# Patient Record
Sex: FEMALE | Race: WHITE | Employment: FULL TIME | URBAN - METROPOLITAN AREA
[De-identification: names, ages, dates, MRNs, and addresses within clinical notes are randomized per-mention and may not be internally consistent; named-entity substitution may affect disease eponyms.]

---

## 2018-03-12 ENCOUNTER — OFFICE VISIT (OUTPATIENT)
Dept: SURGERY | Facility: CLINIC | Age: 48
End: 2018-03-12

## 2018-03-12 ENCOUNTER — APPOINTMENT (OUTPATIENT)
Dept: LAB | Facility: HOSPITAL | Age: 48
End: 2018-03-12
Attending: UROLOGY
Payer: COMMERCIAL

## 2018-03-12 VITALS
DIASTOLIC BLOOD PRESSURE: 75 MMHG | WEIGHT: 155 LBS | TEMPERATURE: 99 F | SYSTOLIC BLOOD PRESSURE: 125 MMHG | BODY MASS INDEX: 25.83 KG/M2 | HEART RATE: 64 BPM | HEIGHT: 65 IN

## 2018-03-12 DIAGNOSIS — R31.29 MICROHEMATURIA: Primary | ICD-10-CM

## 2018-03-12 DIAGNOSIS — R31.29 MICROHEMATURIA: ICD-10-CM

## 2018-03-12 LAB
BILIRUB UR QL: NEGATIVE
COLOR UR: YELLOW
GLUCOSE UR-MCNC: NEGATIVE MG/DL
HGB UR QL STRIP.AUTO: NEGATIVE
KETONES UR-MCNC: NEGATIVE MG/DL
LEUKOCYTE ESTERASE UR QL STRIP.AUTO: NEGATIVE
NITRITE UR QL STRIP.AUTO: NEGATIVE
PH UR: 7 [PH] (ref 5–8)
PROT UR-MCNC: NEGATIVE MG/DL
SP GR UR STRIP: 1.02 (ref 1–1.03)
UROBILINOGEN UR STRIP-ACNC: <2
VIT C UR-MCNC: NEGATIVE MG/DL

## 2018-03-12 PROCEDURE — 99212 OFFICE O/P EST SF 10 MIN: CPT | Performed by: UROLOGY

## 2018-03-12 PROCEDURE — 99244 OFF/OP CNSLTJ NEW/EST MOD 40: CPT | Performed by: UROLOGY

## 2018-03-12 PROCEDURE — 81003 URINALYSIS AUTO W/O SCOPE: CPT

## 2018-03-12 RX ORDER — LEVOTHYROXINE SODIUM 112 UG/1
CAPSULE ORAL
COMMUNITY
Start: 2018-01-25

## 2018-03-12 NOTE — PROGRESS NOTES
SUBJECTIVE:  Brunilda Garnett is a 41year old female who presents for a consultation at the request of, and a copy of this note will be sent to, Dr. Adelso Singh, for evaluation of  hematuria. She states that the problem is unchanged.  Symptoms include none masses, organomegaly or guarding  Skin exam grossly normal     ASSESSMENT/PLAN  Microhematuria  (primary encounter diagnosis)  reviewed findings at length with patient. She will drop off  Urine testing. And we agreed that she would have a follow-up urine w

## 2018-03-19 ENCOUNTER — TELEPHONE (OUTPATIENT)
Dept: SURGERY | Facility: CLINIC | Age: 48
End: 2018-03-19

## 2018-04-24 ENCOUNTER — OFFICE VISIT (OUTPATIENT)
Dept: INTEGRATIVE MEDICINE | Facility: HOSPITAL | Age: 48
End: 2018-04-24
Attending: INTERNAL MEDICINE
Payer: COMMERCIAL

## 2018-04-24 PROCEDURE — 97802 MEDICAL NUTRITION INDIV IN: CPT | Performed by: NUTRITIONIST

## 2018-04-24 NOTE — PROGRESS NOTES
Patient referred by Dr. Azael Patterson  Did patient complete Nutritional Therapy Questionnaire? Anthonette Angelucci is a 41year old female presenting for medical nutrition therapy in regards to metabolic syndrome.   Patient is wanting confidence in her current and to incorporate more bitter foods into the diet to increase digestive enzyme secretion. Suggest foods that contain L-glutamine and glutathione to help optimize nutrient absorption and anti-oxidants in body.   Including bone broth into diet is a good tiffanie

## 2018-04-24 NOTE — PATIENT INSTRUCTIONS
The products and items listed below (the “Products”)  and their claims have not been evaluated by the Food and Drug Administration. Dietary products are not intended to treat, prevent, mitigate or cure disease.  Ultimately, you must draw your own conclusi Directions: Mix one scoop in 2-4 oz water once daily, or as directed by your healthcare practitioner. GlutAloeMine® features four specialized ingredients for enhanced gastrointestinal support.  This unique formula contains a concentrated extract of licor

## 2018-05-17 ENCOUNTER — OFFICE VISIT (OUTPATIENT)
Dept: INTEGRATIVE MEDICINE | Facility: HOSPITAL | Age: 48
End: 2018-05-17
Attending: INTERNAL MEDICINE
Payer: COMMERCIAL

## 2018-05-17 PROCEDURE — 97803 MED NUTRITION INDIV SUBSEQ: CPT | Performed by: NUTRITIONIST

## 2018-05-17 NOTE — PROGRESS NOTES
Patient referred by Dr. Mujica Mood  Did patient complete Nutritional Therapy Questionnaire? Zeus Bolaños is a 41year old female presenting for medical nutrition therapy in regards to metabolic syndrome, specifically weight loss.   Since last visit, mona continue to stay away from carbohydrates and sugar  · Black coffee or herbal teas are ok during fasting times. No Follow-up on file.     Dietary Supplements:   none    NARDA Hernandez  5/17/2018  7:24 AM

## 2018-05-17 NOTE — PATIENT INSTRUCTIONS
The products and items listed below (the “Products”)  and their claims have not been evaluated by the Food and Drug Administration. Dietary products are not intended to treat, prevent, mitigate or cure disease.  Ultimately, you must draw your own conclusi

## 2018-06-21 ENCOUNTER — OFFICE VISIT (OUTPATIENT)
Dept: INTEGRATIVE MEDICINE | Facility: HOSPITAL | Age: 48
End: 2018-06-21
Attending: INTERNAL MEDICINE
Payer: COMMERCIAL

## 2018-06-21 PROCEDURE — 97803 MED NUTRITION INDIV SUBSEQ: CPT | Performed by: NUTRITIONIST

## 2018-06-21 NOTE — PROGRESS NOTES
Patient referred by Dr. Frank Reardon  Did patient complete Nutritional Therapy Questionnaire? Krystal Baker is a 41year old female presenting for medical nutrition therapy in regards to metabolic syndrome, specifically weight loss.   Since last visit, p

## 2018-07-26 ENCOUNTER — APPOINTMENT (OUTPATIENT)
Dept: INTEGRATIVE MEDICINE | Facility: HOSPITAL | Age: 48
End: 2018-07-26
Attending: INTERNAL MEDICINE
Payer: COMMERCIAL

## 2018-08-02 ENCOUNTER — LAB ENCOUNTER (OUTPATIENT)
Dept: LAB | Age: 48
End: 2018-08-02
Attending: INTERNAL MEDICINE
Payer: COMMERCIAL

## 2018-08-02 DIAGNOSIS — E78.2 MIXED HYPERLIPIDEMIA: Primary | ICD-10-CM

## 2018-08-02 DIAGNOSIS — E55.9 VITAMIN D DEFICIENCY: ICD-10-CM

## 2018-08-02 DIAGNOSIS — E03.9 HYPOTHYROIDISM: ICD-10-CM

## 2018-08-02 DIAGNOSIS — E88.81 METABOLIC SYNDROME: ICD-10-CM

## 2018-08-02 DIAGNOSIS — R31.9 HEMATURIA, UNSPECIFIED: ICD-10-CM

## 2018-08-02 DIAGNOSIS — R53.82 CHRONIC FATIGUE SYNDROME: ICD-10-CM

## 2018-08-02 LAB
ALBUMIN SERPL BCP-MCNC: 3.9 G/DL (ref 3.5–4.8)
ALBUMIN/GLOB SERPL: 1.6 {RATIO} (ref 1–2)
ALP SERPL-CCNC: 41 U/L (ref 32–100)
ALT SERPL-CCNC: 14 U/L (ref 14–54)
ANION GAP SERPL CALC-SCNC: 6 MMOL/L (ref 0–18)
AST SERPL-CCNC: 15 U/L (ref 15–41)
BILIRUB SERPL-MCNC: 1.3 MG/DL (ref 0.3–1.2)
BUN SERPL-MCNC: 8 MG/DL (ref 8–20)
BUN/CREAT SERPL: 10.1 (ref 10–20)
CALCIUM SERPL-MCNC: 9.2 MG/DL (ref 8.5–10.5)
CHLORIDE SERPL-SCNC: 107 MMOL/L (ref 95–110)
CO2 SERPL-SCNC: 27 MMOL/L (ref 22–32)
CREAT SERPL-MCNC: 0.79 MG/DL (ref 0.5–1.5)
GLOBULIN PLAS-MCNC: 2.4 G/DL (ref 2.5–3.7)
GLUCOSE SERPL-MCNC: 90 MG/DL (ref 70–99)
OSMOLALITY UR CALC.SUM OF ELEC: 288 MOSM/KG (ref 275–295)
PATIENT FASTING: YES
POTASSIUM SERPL-SCNC: 4.7 MMOL/L (ref 3.3–5.1)
PROT SERPL-MCNC: 6.3 G/DL (ref 5.9–8.4)
SODIUM SERPL-SCNC: 140 MMOL/L (ref 136–144)
T4 FREE SERPL-MCNC: 1.39 NG/DL (ref 0.58–1.64)
TSH SERPL-ACNC: 0.35 UIU/ML (ref 0.45–5.33)

## 2018-08-02 PROCEDURE — 84443 ASSAY THYROID STIM HORMONE: CPT

## 2018-08-02 PROCEDURE — 84439 ASSAY OF FREE THYROXINE: CPT

## 2018-08-02 PROCEDURE — 80053 COMPREHEN METABOLIC PANEL: CPT

## 2018-08-02 PROCEDURE — 86376 MICROSOMAL ANTIBODY EACH: CPT

## 2018-08-02 PROCEDURE — 86800 THYROGLOBULIN ANTIBODY: CPT

## 2018-08-02 PROCEDURE — 36415 COLL VENOUS BLD VENIPUNCTURE: CPT

## 2018-08-03 LAB
THYROGLOBULIN AB: 1.3 IU/ML
THYROID PEROXIDASE (TPO) ANTIBODY: 372 IU/ML

## 2018-08-06 ENCOUNTER — OFFICE VISIT (OUTPATIENT)
Dept: INTERNAL MEDICINE CLINIC | Facility: CLINIC | Age: 48
End: 2018-08-06
Payer: COMMERCIAL

## 2018-08-06 VITALS
HEART RATE: 65 BPM | HEIGHT: 65 IN | DIASTOLIC BLOOD PRESSURE: 70 MMHG | WEIGHT: 154 LBS | SYSTOLIC BLOOD PRESSURE: 118 MMHG | BODY MASS INDEX: 25.66 KG/M2 | OXYGEN SATURATION: 98 % | RESPIRATION RATE: 12 BRPM | TEMPERATURE: 100 F

## 2018-08-06 DIAGNOSIS — E06.3 HASHIMOTO'S DISEASE: ICD-10-CM

## 2018-08-06 DIAGNOSIS — Z00.00 PHYSICAL EXAM: Primary | ICD-10-CM

## 2018-08-06 PROCEDURE — 99386 PREV VISIT NEW AGE 40-64: CPT | Performed by: FAMILY MEDICINE

## 2018-08-06 NOTE — PROGRESS NOTES
HPI:   Zuleyka Palacio is a 41year old female who presents for a complete physical exam.  New pt: amita, Dr. Gaynel Brunner  Last pap:  Sept 2016- Dr Sri Duran   Menses:  Anne-Marie Marx, regular every 23-28 days  Last mammo in nov, hx dense breasts   Contracept GENERAL: well developed, well nourished,in no apparent distress  SKIN: no rashes,no suspicious lesions  HEENT: atraumatic, normocephalic,throat are clear; dentition good  EYES:PERRLA, EOMI,conjunctiva are clear  NECK: supple,no adenopathy  BREAST: no dom

## 2018-08-09 ENCOUNTER — TELEPHONE (OUTPATIENT)
Dept: INTERNAL MEDICINE CLINIC | Facility: CLINIC | Age: 48
End: 2018-08-09

## 2018-08-09 NOTE — TELEPHONE ENCOUNTER
Fax over a medical release form to Dr. Jayce Mcclure office at [facility] for patient named above.

## 2018-08-13 ENCOUNTER — TELEPHONE (OUTPATIENT)
Dept: INTERNAL MEDICINE CLINIC | Facility: CLINIC | Age: 48
End: 2018-08-13

## 2018-08-14 ENCOUNTER — OFFICE VISIT (OUTPATIENT)
Dept: INTEGRATIVE MEDICINE | Facility: HOSPITAL | Age: 48
End: 2018-08-14
Attending: INTERNAL MEDICINE
Payer: COMMERCIAL

## 2018-08-14 VITALS — BODY MASS INDEX: 24 KG/M2 | WEIGHT: 143 LBS

## 2018-08-14 PROCEDURE — 97803 MED NUTRITION INDIV SUBSEQ: CPT | Performed by: NUTRITIONIST

## 2018-08-14 NOTE — PROGRESS NOTES
Patient referred by Dr. Reece Broderick  Did patient complete Nutritional Therapy Questionnaire? Ghazal Figueroa is a 41year old female presenting for medical nutrition therapy in regards to weight loss.   Since last visit, patient continues to practice inte NARDA  8/14/2018  8:14 AM

## 2018-09-17 ENCOUNTER — LAB ENCOUNTER (OUTPATIENT)
Dept: LAB | Facility: HOSPITAL | Age: 48
End: 2018-09-17
Attending: FAMILY MEDICINE
Payer: COMMERCIAL

## 2018-09-17 ENCOUNTER — OFFICE VISIT (OUTPATIENT)
Dept: INTEGRATIVE MEDICINE | Facility: HOSPITAL | Age: 48
End: 2018-09-17
Attending: INTERNAL MEDICINE
Payer: COMMERCIAL

## 2018-09-17 DIAGNOSIS — E78.5 HYPERLIPIDEMIA: ICD-10-CM

## 2018-09-17 DIAGNOSIS — E03.9 HYPOTHYROIDISM: Primary | ICD-10-CM

## 2018-09-17 DIAGNOSIS — E55.9 VITAMIN D DEFICIENCY: ICD-10-CM

## 2018-09-17 DIAGNOSIS — E88.81 METABOLIC SYNDROME: ICD-10-CM

## 2018-09-17 DIAGNOSIS — Z00.00 PHYSICAL EXAM: ICD-10-CM

## 2018-09-17 LAB
BASOPHILS # BLD: 0.1 K/UL (ref 0–0.2)
BASOPHILS NFR BLD: 1 %
CHOLEST SERPL-MCNC: 188 MG/DL (ref 110–200)
EOSINOPHIL # BLD: 0.5 K/UL (ref 0–0.7)
EOSINOPHIL NFR BLD: 9 %
ERYTHROCYTE [DISTWIDTH] IN BLOOD BY AUTOMATED COUNT: 13.3 % (ref 11–15)
HCT VFR BLD AUTO: 39.4 % (ref 35–48)
HDLC SERPL-MCNC: 84 MG/DL
HGB BLD-MCNC: 13.1 G/DL (ref 12–16)
LDLC SERPL CALC-MCNC: 98 MG/DL (ref 0–99)
LYMPHOCYTES # BLD: 1.6 K/UL (ref 1–4)
LYMPHOCYTES NFR BLD: 31 %
MCH RBC QN AUTO: 31.1 PG (ref 27–32)
MCHC RBC AUTO-ENTMCNC: 33.3 G/DL (ref 32–37)
MCV RBC AUTO: 93.4 FL (ref 80–100)
MONOCYTES # BLD: 0.4 K/UL (ref 0–1)
MONOCYTES NFR BLD: 7 %
NEUTROPHILS # BLD AUTO: 2.6 K/UL (ref 1.8–7.7)
NEUTROPHILS NFR BLD: 52 %
NONHDLC SERPL-MCNC: 104 MG/DL
PLATELET # BLD AUTO: 252 K/UL (ref 140–400)
PMV BLD AUTO: 8.9 FL (ref 7.4–10.3)
RBC # BLD AUTO: 4.22 M/UL (ref 3.7–5.4)
T4 FREE SERPL-MCNC: 1.11 NG/DL (ref 0.58–1.64)
TRIGL SERPL-MCNC: 30 MG/DL (ref 1–149)
TSH SERPL-ACNC: 0.49 UIU/ML (ref 0.45–5.33)
WBC # BLD AUTO: 5.1 K/UL (ref 4–11)

## 2018-09-17 PROCEDURE — 97803 MED NUTRITION INDIV SUBSEQ: CPT | Performed by: NUTRITIONIST

## 2018-09-17 PROCEDURE — 36415 COLL VENOUS BLD VENIPUNCTURE: CPT

## 2018-09-17 PROCEDURE — 84439 ASSAY OF FREE THYROXINE: CPT

## 2018-09-17 PROCEDURE — 80061 LIPID PANEL: CPT

## 2018-09-17 PROCEDURE — 84443 ASSAY THYROID STIM HORMONE: CPT

## 2018-09-17 PROCEDURE — 85025 COMPLETE CBC W/AUTO DIFF WBC: CPT

## 2018-09-17 NOTE — PROGRESS NOTES
Patient referred by Dr. Mujica Mood  Did patient complete Nutritional Therapy Questionnaire? Chepe Huntley is a 41year old female presenting for medical nutrition therapy in regards to weight loss. Patient feels she is stuck at her current weight.   I

## 2018-09-17 NOTE — PATIENT INSTRUCTIONS
Prep Day 1   Prep Day 2   Day 1   Day 2   Day 3   Day 4   Day 5   Day 6   Upon Awakening 6 drops Drainage on tongue 6 drops Drainage on tongue 6 drops Drainage on tongue AND  1 capsule ProbioMax 6 drops Drainage on tongue AND  1 capsule ProbioMax 6 drop

## 2019-01-05 ENCOUNTER — LAB ENCOUNTER (OUTPATIENT)
Dept: LAB | Age: 49
End: 2019-01-05
Attending: INTERNAL MEDICINE
Payer: COMMERCIAL

## 2019-01-05 DIAGNOSIS — E78.2 MIXED HYPERLIPIDEMIA: ICD-10-CM

## 2019-01-05 DIAGNOSIS — E03.9 MYXEDEMA HEART DISEASE: Primary | ICD-10-CM

## 2019-01-05 DIAGNOSIS — I51.9 MYXEDEMA HEART DISEASE: Primary | ICD-10-CM

## 2019-01-05 DIAGNOSIS — E88.81 DYSMETABOLIC SYNDROME X: ICD-10-CM

## 2019-01-05 DIAGNOSIS — E55.9 AVITAMINOSIS D: ICD-10-CM

## 2019-01-05 LAB
T4 FREE SERPL-MCNC: 0.9 NG/DL (ref 0.58–1.64)
TSH SERPL-ACNC: 2.16 UIU/ML (ref 0.45–5.33)

## 2019-01-05 PROCEDURE — 84443 ASSAY THYROID STIM HORMONE: CPT

## 2019-01-05 PROCEDURE — 84439 ASSAY OF FREE THYROXINE: CPT

## 2019-01-05 PROCEDURE — 36415 COLL VENOUS BLD VENIPUNCTURE: CPT

## 2019-01-05 PROCEDURE — 82306 VITAMIN D 25 HYDROXY: CPT

## 2019-01-07 LAB — 25(OH)D3 SERPL-MCNC: 27.2 NG/ML (ref 30–100)

## 2019-01-19 ENCOUNTER — HOSPITAL ENCOUNTER (OUTPATIENT)
Dept: MAMMOGRAPHY | Age: 49
Discharge: HOME | End: 2019-01-19
Attending: FAMILY MEDICINE
Payer: COMMERCIAL

## 2019-01-19 DIAGNOSIS — Z00.00 PHYSICAL EXAM: ICD-10-CM

## 2019-01-19 PROCEDURE — 77063 BREAST TOMOSYNTHESIS BI: CPT | Performed by: FAMILY MEDICINE

## 2019-01-19 PROCEDURE — 77067 SCR MAMMO BI INCL CAD: CPT | Performed by: FAMILY MEDICINE

## 2019-01-30 ENCOUNTER — HOSPITAL ENCOUNTER (OUTPATIENT)
Dept: MAMMOGRAPHY | Facility: HOSPITAL | Age: 49
Discharge: HOME | End: 2019-01-30
Attending: FAMILY MEDICINE
Payer: COMMERCIAL

## 2019-01-30 ENCOUNTER — HOSPITAL ENCOUNTER (OUTPATIENT)
Dept: ULTRASOUND IMAGING | Facility: HOSPITAL | Age: 49
Discharge: HOME | End: 2019-01-30
Attending: FAMILY MEDICINE
Payer: COMMERCIAL

## 2019-01-30 DIAGNOSIS — R92.8 ABNORMAL MAMMOGRAM: ICD-10-CM

## 2019-01-30 PROCEDURE — 76642 ULTRASOUND BREAST LIMITED: CPT | Performed by: FAMILY MEDICINE

## 2019-01-30 PROCEDURE — 77061 BREAST TOMOSYNTHESIS UNI: CPT | Performed by: FAMILY MEDICINE

## 2019-01-30 PROCEDURE — 77065 DX MAMMO INCL CAD UNI: CPT | Performed by: FAMILY MEDICINE

## 2019-06-22 ENCOUNTER — LAB ENCOUNTER (OUTPATIENT)
Dept: LAB | Age: 49
End: 2019-06-22
Attending: INTERNAL MEDICINE
Payer: COMMERCIAL

## 2019-06-22 DIAGNOSIS — I51.9 MYXEDEMA HEART DISEASE: Primary | ICD-10-CM

## 2019-06-22 DIAGNOSIS — E55.9 AVITAMINOSIS D: ICD-10-CM

## 2019-06-22 DIAGNOSIS — E78.2 MIXED HYPERLIPIDEMIA: ICD-10-CM

## 2019-06-22 DIAGNOSIS — E88.81 DYSMETABOLIC SYNDROME X: ICD-10-CM

## 2019-06-22 DIAGNOSIS — E55.9 VITAMIN D DEFICIENCY: ICD-10-CM

## 2019-06-22 DIAGNOSIS — E03.9 MYXEDEMA HEART DISEASE: Primary | ICD-10-CM

## 2019-06-22 PROCEDURE — 82306 VITAMIN D 25 HYDROXY: CPT

## 2019-06-22 PROCEDURE — 84439 ASSAY OF FREE THYROXINE: CPT

## 2019-06-22 PROCEDURE — 80053 COMPREHEN METABOLIC PANEL: CPT

## 2019-06-22 PROCEDURE — 36415 COLL VENOUS BLD VENIPUNCTURE: CPT

## 2019-06-22 PROCEDURE — 85025 COMPLETE CBC W/AUTO DIFF WBC: CPT

## 2019-06-22 PROCEDURE — 80061 LIPID PANEL: CPT

## 2019-06-22 PROCEDURE — 84443 ASSAY THYROID STIM HORMONE: CPT

## 2019-10-10 ENCOUNTER — OFFICE VISIT (OUTPATIENT)
Dept: INTERNAL MEDICINE CLINIC | Facility: CLINIC | Age: 49
End: 2019-10-10
Payer: COMMERCIAL

## 2019-10-10 VITALS
SYSTOLIC BLOOD PRESSURE: 104 MMHG | DIASTOLIC BLOOD PRESSURE: 72 MMHG | WEIGHT: 154.38 LBS | BODY MASS INDEX: 25.72 KG/M2 | HEART RATE: 71 BPM | TEMPERATURE: 99 F | RESPIRATION RATE: 18 BRPM | OXYGEN SATURATION: 99 % | HEIGHT: 65 IN

## 2019-10-10 DIAGNOSIS — Z00.00 PHYSICAL EXAM: Primary | ICD-10-CM

## 2019-10-10 PROCEDURE — 99396 PREV VISIT EST AGE 40-64: CPT | Performed by: FAMILY MEDICINE

## 2019-10-10 PROCEDURE — 87624 HPV HI-RISK TYP POOLED RSLT: CPT | Performed by: FAMILY MEDICINE

## 2019-10-10 RX ORDER — ERGOCALCIFEROL 1.25 MG/1
CAPSULE ORAL
COMMUNITY
Start: 2019-10-04

## 2019-10-10 NOTE — PROGRESS NOTES
HPI:   Phil Poewll is a 57year old female who presents for a complete physical exam.  Last pap:  Sept 2016-  Normal   Menses:  Light, regular every 24 days, shorter cycles   mammo due 1/20  Contraception:  vasectomy  Supplements:  Vit D   Exercise:  L discharge or itching  NEURO: denies headaches  PSYCHE: denies depression or anxiety    EXAM:   /72   Pulse 71   Temp 99.3 °F (37.4 °C) (Oral)   Resp 18   Ht 65\"   Wt 154 lb 6.4 oz (70 kg)   LMP 09/26/2019 (Approximate)   SpO2 99%   BMI 25.69 kg/m²

## 2020-01-16 ENCOUNTER — TELEPHONE (OUTPATIENT)
Dept: INTERNAL MEDICINE CLINIC | Facility: CLINIC | Age: 50
End: 2020-01-16

## 2020-01-16 NOTE — TELEPHONE ENCOUNTER
LEFT MESSAGE ON NURSE LINE:   Patient left message states she needs a order for mammogram.  (looks like there's a order in the system already)     Please call pt back.      [phone]

## 2020-01-25 ENCOUNTER — LAB ENCOUNTER (OUTPATIENT)
Dept: LAB | Age: 50
End: 2020-01-25
Attending: INTERNAL MEDICINE
Payer: COMMERCIAL

## 2020-01-25 DIAGNOSIS — E03.9 HYPOTHYROIDISM, ADULT: Primary | ICD-10-CM

## 2020-01-25 LAB — TSI SER-ACNC: 1.51 MIU/ML (ref 0.36–3.74)

## 2020-01-25 PROCEDURE — 84443 ASSAY THYROID STIM HORMONE: CPT

## 2020-01-25 PROCEDURE — 36415 COLL VENOUS BLD VENIPUNCTURE: CPT

## 2020-02-04 ENCOUNTER — HOSPITAL ENCOUNTER (OUTPATIENT)
Dept: MAMMOGRAPHY | Age: 50
Discharge: HOME | End: 2020-02-04
Attending: FAMILY MEDICINE
Payer: COMMERCIAL

## 2020-02-04 DIAGNOSIS — Z00.00 PHYSICAL EXAM: ICD-10-CM

## 2020-02-04 PROCEDURE — 77063 BREAST TOMOSYNTHESIS BI: CPT | Performed by: FAMILY MEDICINE

## 2020-02-04 PROCEDURE — 77067 SCR MAMMO BI INCL CAD: CPT | Performed by: FAMILY MEDICINE

## 2020-09-19 ENCOUNTER — LAB ENCOUNTER (OUTPATIENT)
Dept: LAB | Age: 50
End: 2020-09-19
Attending: INTERNAL MEDICINE
Payer: COMMERCIAL

## 2020-09-19 DIAGNOSIS — E78.2 MIXED HYPERLIPIDEMIA: ICD-10-CM

## 2020-09-19 DIAGNOSIS — E55.9 VITAMIN D DEFICIENCY: ICD-10-CM

## 2020-09-19 DIAGNOSIS — E88.81 METABOLIC SYNDROME: ICD-10-CM

## 2020-09-19 DIAGNOSIS — E03.9 HYPOTHYROIDISM: Primary | ICD-10-CM

## 2020-09-19 LAB
ALBUMIN SERPL-MCNC: 3.7 G/DL (ref 3.4–5)
ALBUMIN/GLOB SERPL: 1 {RATIO} (ref 1–2)
ALP LIVER SERPL-CCNC: 60 U/L
ALT SERPL-CCNC: 14 U/L
ANION GAP SERPL CALC-SCNC: 4 MMOL/L (ref 0–18)
AST SERPL-CCNC: 13 U/L (ref 15–37)
BASOPHILS # BLD AUTO: 0.08 X10(3) UL (ref 0–0.2)
BASOPHILS NFR BLD AUTO: 1.3 %
BILIRUB SERPL-MCNC: 0.9 MG/DL (ref 0.1–2)
BUN BLD-MCNC: 11 MG/DL (ref 7–18)
BUN/CREAT SERPL: 15.3 (ref 10–20)
CALCIUM BLD-MCNC: 9.2 MG/DL (ref 8.5–10.1)
CHLORIDE SERPL-SCNC: 108 MMOL/L (ref 98–112)
CHOLEST SMN-MCNC: 211 MG/DL (ref ?–200)
CO2 SERPL-SCNC: 27 MMOL/L (ref 21–32)
CREAT BLD-MCNC: 0.72 MG/DL
DEPRECATED RDW RBC AUTO: 43 FL (ref 35.1–46.3)
EOSINOPHIL # BLD AUTO: 0.34 X10(3) UL (ref 0–0.7)
EOSINOPHIL NFR BLD AUTO: 5.4 %
ERYTHROCYTE [DISTWIDTH] IN BLOOD BY AUTOMATED COUNT: 12.5 % (ref 11–15)
GLOBULIN PLAS-MCNC: 3.8 G/DL (ref 2.8–4.4)
GLUCOSE BLD-MCNC: 99 MG/DL (ref 70–99)
HCT VFR BLD AUTO: 40.5 %
HDLC SERPL-MCNC: 83 MG/DL (ref 40–59)
HGB BLD-MCNC: 13.5 G/DL
IMM GRANULOCYTES # BLD AUTO: 0.01 X10(3) UL (ref 0–1)
IMM GRANULOCYTES NFR BLD: 0.2 %
LDLC SERPL CALC-MCNC: 117 MG/DL (ref ?–100)
LYMPHOCYTES # BLD AUTO: 1.84 X10(3) UL (ref 1–4)
LYMPHOCYTES NFR BLD AUTO: 29 %
M PROTEIN MFR SERPL ELPH: 7.5 G/DL (ref 6.4–8.2)
MCH RBC QN AUTO: 31 PG (ref 26–34)
MCHC RBC AUTO-ENTMCNC: 33.3 G/DL (ref 31–37)
MCV RBC AUTO: 92.9 FL
MONOCYTES # BLD AUTO: 0.47 X10(3) UL (ref 0.1–1)
MONOCYTES NFR BLD AUTO: 7.4 %
NEUTROPHILS # BLD AUTO: 3.61 X10 (3) UL (ref 1.5–7.7)
NEUTROPHILS # BLD AUTO: 3.61 X10(3) UL (ref 1.5–7.7)
NEUTROPHILS NFR BLD AUTO: 56.7 %
NONHDLC SERPL-MCNC: 128 MG/DL (ref ?–130)
OSMOLALITY SERPL CALC.SUM OF ELEC: 287 MOSM/KG (ref 275–295)
PATIENT FASTING Y/N/NP: YES
PATIENT FASTING Y/N/NP: YES
PLATELET # BLD AUTO: 339 10(3)UL (ref 150–450)
POTASSIUM SERPL-SCNC: 4.3 MMOL/L (ref 3.5–5.1)
RBC # BLD AUTO: 4.36 X10(6)UL
SODIUM SERPL-SCNC: 139 MMOL/L (ref 136–145)
T4 FREE SERPL-MCNC: 1 NG/DL (ref 0.8–1.7)
TRIGL SERPL-MCNC: 53 MG/DL (ref 30–149)
TSI SER-ACNC: 4.79 MIU/ML (ref 0.36–3.74)
VLDLC SERPL CALC-MCNC: 11 MG/DL (ref 0–30)
WBC # BLD AUTO: 6.4 X10(3) UL (ref 4–11)

## 2020-09-19 PROCEDURE — 36415 COLL VENOUS BLD VENIPUNCTURE: CPT

## 2020-09-19 PROCEDURE — 80053 COMPREHEN METABOLIC PANEL: CPT

## 2020-09-19 PROCEDURE — 84439 ASSAY OF FREE THYROXINE: CPT

## 2020-09-19 PROCEDURE — 85025 COMPLETE CBC W/AUTO DIFF WBC: CPT

## 2020-09-19 PROCEDURE — 82306 VITAMIN D 25 HYDROXY: CPT

## 2020-09-19 PROCEDURE — 80061 LIPID PANEL: CPT

## 2020-09-19 PROCEDURE — 84443 ASSAY THYROID STIM HORMONE: CPT

## 2020-09-21 LAB — 25(OH)D3 SERPL-MCNC: 44 NG/ML (ref 30–100)

## 2020-11-21 ENCOUNTER — LAB ENCOUNTER (OUTPATIENT)
Dept: LAB | Age: 50
End: 2020-11-21
Attending: INTERNAL MEDICINE
Payer: COMMERCIAL

## 2020-11-21 DIAGNOSIS — E03.9 HYPOTHYROIDISM: Primary | ICD-10-CM

## 2020-11-21 PROCEDURE — 36415 COLL VENOUS BLD VENIPUNCTURE: CPT

## 2020-11-21 PROCEDURE — 84443 ASSAY THYROID STIM HORMONE: CPT

## 2021-03-18 ENCOUNTER — OFFICE VISIT (OUTPATIENT)
Dept: INTERNAL MEDICINE CLINIC | Facility: CLINIC | Age: 51
End: 2021-03-18
Payer: COMMERCIAL

## 2021-03-18 VITALS
RESPIRATION RATE: 16 BRPM | DIASTOLIC BLOOD PRESSURE: 78 MMHG | HEIGHT: 65 IN | BODY MASS INDEX: 25.33 KG/M2 | SYSTOLIC BLOOD PRESSURE: 112 MMHG | WEIGHT: 152 LBS | HEART RATE: 62 BPM

## 2021-03-18 DIAGNOSIS — Z00.00 PHYSICAL EXAM: Primary | ICD-10-CM

## 2021-03-18 DIAGNOSIS — Z12.31 ENCOUNTER FOR SCREENING MAMMOGRAM FOR MALIGNANT NEOPLASM OF BREAST: ICD-10-CM

## 2021-03-18 LAB
C TRACH DNA SPEC QL NAA+PROBE: NEGATIVE
N GONORRHOEA DNA SPEC QL NAA+PROBE: NEGATIVE

## 2021-03-18 PROCEDURE — 99396 PREV VISIT EST AGE 40-64: CPT | Performed by: FAMILY MEDICINE

## 2021-03-18 PROCEDURE — 3078F DIAST BP <80 MM HG: CPT | Performed by: FAMILY MEDICINE

## 2021-03-18 PROCEDURE — 3074F SYST BP LT 130 MM HG: CPT | Performed by: FAMILY MEDICINE

## 2021-03-18 PROCEDURE — 3008F BODY MASS INDEX DOCD: CPT | Performed by: FAMILY MEDICINE

## 2021-03-18 PROCEDURE — 87491 CHLMYD TRACH DNA AMP PROBE: CPT | Performed by: FAMILY MEDICINE

## 2021-03-18 PROCEDURE — 87591 N.GONORRHOEAE DNA AMP PROB: CPT | Performed by: FAMILY MEDICINE

## 2021-03-18 RX ORDER — LEVOTHYROXINE SODIUM 125 UG/1
CAPSULE ORAL
COMMUNITY
Start: 2021-03-13

## 2021-03-18 NOTE — PROGRESS NOTES
HPI:   Shannan Escalante is a 52year old female who presents for a complete physical exam.  Last pap: 10/19 normal and HPV negative  Menses:  Light, regular every 21 days, shorter cycles , more symptoms with them  Recently   New sexual partner   Cont any unusual skin lesions  EYES:no vision problems  LUNGS: denies shortness of breath  CARDIOVASCULAR: denies chest pain  GI: denies abdominal pain,  No constipation or diarrhea  : denies dysuria, vaginal discharge or itching  NEURO: denies headaches  PSY Imaging & Consults:  SIM ASTER 2D+3D SCREENING BILAT (CPT=77067/15919)

## 2021-03-30 ENCOUNTER — LAB ENCOUNTER (OUTPATIENT)
Dept: LAB | Age: 51
End: 2021-03-30
Attending: FAMILY MEDICINE
Payer: COMMERCIAL

## 2021-03-30 ENCOUNTER — APPOINTMENT (OUTPATIENT)
Dept: MAMMOGRAPHY | Age: 51
End: 2021-03-30
Attending: FAMILY MEDICINE
Payer: COMMERCIAL

## 2021-03-30 DIAGNOSIS — E03.9 HYPOTHYROIDISM: Primary | ICD-10-CM

## 2021-03-30 DIAGNOSIS — E55.9 VITAMIN D DEFICIENCY: ICD-10-CM

## 2021-03-30 DIAGNOSIS — E88.81 METABOLIC SYNDROME: ICD-10-CM

## 2021-03-30 DIAGNOSIS — E78.5 HYPERLIPEMIA: ICD-10-CM

## 2021-03-30 PROCEDURE — 85025 COMPLETE CBC W/AUTO DIFF WBC: CPT

## 2021-03-30 PROCEDURE — 84439 ASSAY OF FREE THYROXINE: CPT

## 2021-03-30 PROCEDURE — 80061 LIPID PANEL: CPT

## 2021-03-30 PROCEDURE — 82306 VITAMIN D 25 HYDROXY: CPT

## 2021-03-30 PROCEDURE — 80053 COMPREHEN METABOLIC PANEL: CPT

## 2021-03-30 PROCEDURE — 36415 COLL VENOUS BLD VENIPUNCTURE: CPT

## 2021-03-30 PROCEDURE — 84443 ASSAY THYROID STIM HORMONE: CPT

## 2021-04-30 ENCOUNTER — HOSPITAL ENCOUNTER (OUTPATIENT)
Dept: MAMMOGRAPHY | Age: 51
Discharge: HOME | End: 2021-04-30
Attending: FAMILY MEDICINE
Payer: COMMERCIAL

## 2021-04-30 DIAGNOSIS — Z12.31 ENCOUNTER FOR SCREENING MAMMOGRAM FOR MALIGNANT NEOPLASM OF BREAST: ICD-10-CM

## 2021-04-30 PROCEDURE — 77063 BREAST TOMOSYNTHESIS BI: CPT | Performed by: FAMILY MEDICINE

## 2021-04-30 PROCEDURE — 77067 SCR MAMMO BI INCL CAD: CPT | Performed by: FAMILY MEDICINE

## 2021-09-22 ENCOUNTER — LAB ENCOUNTER (OUTPATIENT)
Dept: LAB | Age: 51
End: 2021-09-22
Attending: INTERNAL MEDICINE
Payer: COMMERCIAL

## 2021-09-22 DIAGNOSIS — R53.82 CHRONIC FATIGUE SYNDROME: ICD-10-CM

## 2021-09-22 DIAGNOSIS — E55.9 AVITAMINOSIS D: ICD-10-CM

## 2021-09-22 DIAGNOSIS — I51.9 MYXEDEMA HEART DISEASE: Primary | ICD-10-CM

## 2021-09-22 DIAGNOSIS — E03.9 MYXEDEMA HEART DISEASE: Primary | ICD-10-CM

## 2021-09-22 DIAGNOSIS — E88.81 DYSMETABOLIC SYNDROME X: ICD-10-CM

## 2021-09-22 LAB
T4 FREE SERPL-MCNC: 1.3 NG/DL (ref 0.8–1.7)
TSI SER-ACNC: 0.61 MIU/ML (ref 0.36–3.74)

## 2021-09-22 PROCEDURE — 84439 ASSAY OF FREE THYROXINE: CPT

## 2021-09-22 PROCEDURE — 36415 COLL VENOUS BLD VENIPUNCTURE: CPT

## 2021-09-22 PROCEDURE — 84443 ASSAY THYROID STIM HORMONE: CPT

## 2021-12-22 ENCOUNTER — OFFICE VISIT (OUTPATIENT)
Dept: FAMILY MEDICINE CLINIC | Facility: CLINIC | Age: 51
End: 2021-12-22
Payer: COMMERCIAL

## 2021-12-22 VITALS
SYSTOLIC BLOOD PRESSURE: 140 MMHG | DIASTOLIC BLOOD PRESSURE: 62 MMHG | HEART RATE: 77 BPM | OXYGEN SATURATION: 100 % | TEMPERATURE: 99 F | RESPIRATION RATE: 18 BRPM | BODY MASS INDEX: 24.45 KG/M2 | HEIGHT: 64.5 IN | WEIGHT: 145 LBS

## 2021-12-22 DIAGNOSIS — Z20.822 ENCOUNTER FOR SCREENING LABORATORY TESTING FOR COVID-19 VIRUS IN ASYMPTOMATIC PATIENT: Primary | ICD-10-CM

## 2021-12-22 PROCEDURE — 3078F DIAST BP <80 MM HG: CPT | Performed by: NURSE PRACTITIONER

## 2021-12-22 PROCEDURE — 99212 OFFICE O/P EST SF 10 MIN: CPT | Performed by: NURSE PRACTITIONER

## 2021-12-22 PROCEDURE — 3008F BODY MASS INDEX DOCD: CPT | Performed by: NURSE PRACTITIONER

## 2021-12-22 PROCEDURE — 3077F SYST BP >= 140 MM HG: CPT | Performed by: NURSE PRACTITIONER

## 2021-12-22 NOTE — PROGRESS NOTES
CHIEF COMPLAINT:   Patient presents with:  Covid-19 Test      HPI:   Sean Haley is a 54year old female who presents for Covid 19 exposure 4 days ago. At this time, not experiencing upper respiratory symptoms, fever, or gastrointestinal symptoms. ASSESSMENT AND PLAN:   Ramonita Cassidy is a 54year old female who presents with     ASSESSMENT: Encounter for screening laboratory testing for covid-19 virus in asymptomatic patient  (primary encounter diagnosis)    PLAN:   OTC Tylenol as needed.   Re

## 2022-03-09 ENCOUNTER — LAB ENCOUNTER (OUTPATIENT)
Dept: LAB | Age: 52
End: 2022-03-09
Attending: FAMILY MEDICINE
Payer: COMMERCIAL

## 2022-03-09 DIAGNOSIS — E03.9 HYPOTHYROIDISM, ADULT: Primary | ICD-10-CM

## 2022-03-09 DIAGNOSIS — E88.81 METABOLIC SYNDROME: ICD-10-CM

## 2022-03-09 DIAGNOSIS — E55.9 VITAMIN D DEFICIENCY: ICD-10-CM

## 2022-03-09 DIAGNOSIS — E78.2 MIXED HYPERLIPIDEMIA: ICD-10-CM

## 2022-03-09 LAB
ALBUMIN SERPL-MCNC: 4.1 G/DL (ref 3.4–5)
ALBUMIN/GLOB SERPL: 1.4 {RATIO} (ref 1–2)
ALP LIVER SERPL-CCNC: 46 U/L
ALT SERPL-CCNC: 18 U/L
ANION GAP SERPL CALC-SCNC: 8 MMOL/L (ref 0–18)
AST SERPL-CCNC: 18 U/L (ref 15–37)
BASOPHILS # BLD AUTO: 0.04 X10(3) UL (ref 0–0.2)
BASOPHILS NFR BLD AUTO: 0.6 %
BUN BLD-MCNC: 11 MG/DL (ref 7–18)
BUN/CREAT SERPL: 14.5 (ref 10–20)
CALCIUM BLD-MCNC: 9.5 MG/DL (ref 8.5–10.1)
CHLORIDE SERPL-SCNC: 105 MMOL/L (ref 98–112)
CHOLEST SERPL-MCNC: 214 MG/DL (ref ?–200)
CO2 SERPL-SCNC: 28 MMOL/L (ref 21–32)
CREAT BLD-MCNC: 0.76 MG/DL
DEPRECATED RDW RBC AUTO: 43.7 FL (ref 35.1–46.3)
EOSINOPHIL # BLD AUTO: 0.39 X10(3) UL (ref 0–0.7)
EOSINOPHIL NFR BLD AUTO: 6.1 %
ERYTHROCYTE [DISTWIDTH] IN BLOOD BY AUTOMATED COUNT: 12.5 % (ref 11–15)
FASTING PATIENT LIPID ANSWER: YES
FASTING STATUS PATIENT QL REPORTED: YES
GLOBULIN PLAS-MCNC: 3 G/DL (ref 2.8–4.4)
GLUCOSE BLD-MCNC: 92 MG/DL (ref 70–99)
HCT VFR BLD AUTO: 41.3 %
HDLC SERPL-MCNC: 74 MG/DL (ref 40–59)
HGB BLD-MCNC: 13.6 G/DL
IMM GRANULOCYTES # BLD AUTO: 0.02 X10(3) UL (ref 0–1)
IMM GRANULOCYTES NFR BLD: 0.3 %
LDLC SERPL CALC-MCNC: 129 MG/DL (ref ?–100)
LYMPHOCYTES # BLD AUTO: 2.16 X10(3) UL (ref 1–4)
LYMPHOCYTES NFR BLD AUTO: 33.8 %
MCH RBC QN AUTO: 31.1 PG (ref 26–34)
MCHC RBC AUTO-ENTMCNC: 32.9 G/DL (ref 31–37)
MCV RBC AUTO: 94.3 FL
MONOCYTES # BLD AUTO: 0.48 X10(3) UL (ref 0.1–1)
MONOCYTES NFR BLD AUTO: 7.5 %
NEUTROPHILS # BLD AUTO: 3.31 X10 (3) UL (ref 1.5–7.7)
NEUTROPHILS # BLD AUTO: 3.31 X10(3) UL (ref 1.5–7.7)
NEUTROPHILS NFR BLD AUTO: 51.7 %
OSMOLALITY SERPL CALC.SUM OF ELEC: 291 MOSM/KG (ref 275–295)
PLATELET # BLD AUTO: 264 10(3)UL (ref 150–450)
POTASSIUM SERPL-SCNC: 4.2 MMOL/L (ref 3.5–5.1)
PROT SERPL-MCNC: 7.1 G/DL (ref 6.4–8.2)
RBC # BLD AUTO: 4.38 X10(6)UL
SODIUM SERPL-SCNC: 141 MMOL/L (ref 136–145)
T4 FREE SERPL-MCNC: 1.3 NG/DL (ref 0.8–1.7)
TRIGL SERPL-MCNC: 62 MG/DL (ref 30–149)
TSI SER-ACNC: 1.21 MIU/ML (ref 0.36–3.74)
VIT D+METAB SERPL-MCNC: 71.8 NG/ML (ref 30–100)
VLDLC SERPL CALC-MCNC: 11 MG/DL (ref 0–30)
WBC # BLD AUTO: 6.4 X10(3) UL (ref 4–11)

## 2022-03-09 PROCEDURE — 36415 COLL VENOUS BLD VENIPUNCTURE: CPT

## 2022-03-09 PROCEDURE — 84439 ASSAY OF FREE THYROXINE: CPT

## 2022-03-09 PROCEDURE — 85025 COMPLETE CBC W/AUTO DIFF WBC: CPT

## 2022-03-09 PROCEDURE — 80061 LIPID PANEL: CPT

## 2022-03-09 PROCEDURE — 84443 ASSAY THYROID STIM HORMONE: CPT

## 2022-03-09 PROCEDURE — 82306 VITAMIN D 25 HYDROXY: CPT

## 2022-03-09 PROCEDURE — 80053 COMPREHEN METABOLIC PANEL: CPT

## 2022-03-21 ENCOUNTER — OFFICE VISIT (OUTPATIENT)
Dept: INTERNAL MEDICINE CLINIC | Facility: CLINIC | Age: 52
End: 2022-03-21
Payer: COMMERCIAL

## 2022-03-21 VITALS
HEIGHT: 64.5 IN | BODY MASS INDEX: 25.8 KG/M2 | SYSTOLIC BLOOD PRESSURE: 110 MMHG | DIASTOLIC BLOOD PRESSURE: 68 MMHG | WEIGHT: 153 LBS

## 2022-03-21 DIAGNOSIS — R55 VASOVAGAL EPISODE: ICD-10-CM

## 2022-03-21 DIAGNOSIS — Z12.31 ENCOUNTER FOR SCREENING MAMMOGRAM FOR MALIGNANT NEOPLASM OF BREAST: ICD-10-CM

## 2022-03-21 DIAGNOSIS — Z00.00 PHYSICAL EXAM: Primary | ICD-10-CM

## 2022-03-21 DIAGNOSIS — Z12.11 COLON CANCER SCREENING: ICD-10-CM

## 2022-03-21 PROCEDURE — 3008F BODY MASS INDEX DOCD: CPT | Performed by: FAMILY MEDICINE

## 2022-03-21 PROCEDURE — 3078F DIAST BP <80 MM HG: CPT | Performed by: FAMILY MEDICINE

## 2022-03-21 PROCEDURE — 3074F SYST BP LT 130 MM HG: CPT | Performed by: FAMILY MEDICINE

## 2022-03-21 PROCEDURE — 99396 PREV VISIT EST AGE 40-64: CPT | Performed by: FAMILY MEDICINE

## 2022-04-28 ENCOUNTER — PATIENT MESSAGE (OUTPATIENT)
Dept: INTERNAL MEDICINE CLINIC | Facility: CLINIC | Age: 52
End: 2022-04-28

## 2022-04-28 RX ORDER — LEVOTHYROXINE SODIUM 125 UG/1
1 CAPSULE ORAL
Qty: 90 CAPSULE | Refills: 0 | Status: SHIPPED | OUTPATIENT
Start: 2022-04-28

## 2022-04-28 NOTE — TELEPHONE ENCOUNTER
From: Phi Trejo  To: Kelly Alcaraz MD  Sent: 4/28/2022 9:13 AM CDT  Subject: Referral for Endocrinologist    Hi Dr Jessica Joseph,    I still don't have the thyroid medicine I need from Dr. Oliver Adame, and I am completely done with their incompetent office. I am going to schedule to see a new endocrinologist. I found Dr. Yuriy Garsia on [facility]'s website, and was wondering if you would recommend her or have another recommendation you can provide. Since I am totally out of my thyroid medicine now, I would like to schedule with them soon, so any referral you can provide would be greatly appreciated. Thank you.

## 2022-05-02 ENCOUNTER — HOSPITAL ENCOUNTER (OUTPATIENT)
Dept: MAMMOGRAPHY | Age: 52
Discharge: HOME | End: 2022-05-02
Attending: FAMILY MEDICINE
Payer: COMMERCIAL

## 2022-05-02 DIAGNOSIS — Z12.31 ENCOUNTER FOR SCREENING MAMMOGRAM FOR MALIGNANT NEOPLASM OF BREAST: ICD-10-CM

## 2022-05-02 PROCEDURE — 77063 BREAST TOMOSYNTHESIS BI: CPT | Performed by: FAMILY MEDICINE

## 2022-05-02 PROCEDURE — 77067 SCR MAMMO BI INCL CAD: CPT | Performed by: FAMILY MEDICINE

## 2022-05-05 ENCOUNTER — PATIENT MESSAGE (OUTPATIENT)
Dept: INTERNAL MEDICINE CLINIC | Facility: CLINIC | Age: 52
End: 2022-05-05

## 2022-05-05 ENCOUNTER — OFFICE VISIT (OUTPATIENT)
Dept: INTERNAL MEDICINE CLINIC | Facility: CLINIC | Age: 52
End: 2022-05-05
Payer: COMMERCIAL

## 2022-05-05 VITALS
WEIGHT: 149 LBS | BODY MASS INDEX: 25.13 KG/M2 | HEIGHT: 64.5 IN | DIASTOLIC BLOOD PRESSURE: 72 MMHG | SYSTOLIC BLOOD PRESSURE: 124 MMHG | HEART RATE: 59 BPM | OXYGEN SATURATION: 100 %

## 2022-05-05 DIAGNOSIS — F43.9 STRESS: ICD-10-CM

## 2022-05-05 DIAGNOSIS — E66.3 OVERWEIGHT (BMI 25.0-29.9): ICD-10-CM

## 2022-05-05 DIAGNOSIS — E03.9 HYPOTHYROIDISM, UNSPECIFIED TYPE: Primary | ICD-10-CM

## 2022-05-05 DIAGNOSIS — E06.3 HASHIMOTO'S DISEASE: ICD-10-CM

## 2022-05-05 DIAGNOSIS — R63.5 ABNORMAL WEIGHT GAIN: ICD-10-CM

## 2022-05-05 LAB
CARTRIDGE LOT#: 889 NUMERIC
HEMOGLOBIN A1C: 5.4 % (ref 4.3–5.6)

## 2022-05-05 PROCEDURE — 99215 OFFICE O/P EST HI 40 MIN: CPT | Performed by: INTERNAL MEDICINE

## 2022-05-05 PROCEDURE — 3074F SYST BP LT 130 MM HG: CPT | Performed by: INTERNAL MEDICINE

## 2022-05-05 PROCEDURE — 3078F DIAST BP <80 MM HG: CPT | Performed by: INTERNAL MEDICINE

## 2022-05-05 PROCEDURE — 83036 HEMOGLOBIN GLYCOSYLATED A1C: CPT | Performed by: INTERNAL MEDICINE

## 2022-05-05 PROCEDURE — 3008F BODY MASS INDEX DOCD: CPT | Performed by: INTERNAL MEDICINE

## 2022-05-05 RX ORDER — BUPROPION HYDROCHLORIDE 150 MG/1
150 TABLET ORAL DAILY
Qty: 30 TABLET | Refills: 1 | Status: SHIPPED | OUTPATIENT
Start: 2022-05-05

## 2022-05-05 RX ORDER — BUPROPION HYDROCHLORIDE 150 MG/1
150 TABLET ORAL DAILY
Qty: 30 TABLET | Refills: 1 | Status: SHIPPED | OUTPATIENT
Start: 2022-05-05 | End: 2022-06-04

## 2022-05-05 RX ORDER — LEVOTHYROXINE SODIUM 0.12 MG/1
TABLET ORAL
COMMUNITY
Start: 2022-04-29

## 2022-05-06 NOTE — TELEPHONE ENCOUNTER
[place], [address] 5/5/2022 5:31 PM CDT      ----- Message -----  From: Derek Giron  Sent: 5/5/2022 5:30 PM CDT  To: Arnie Guardado Clinical Staff  Subject: follow up     Hi Dr. Misa Avila, yes I realized that too late with all the craziness at my work today. I think I would like to fill it. Can you send it in to the [facility] [place] in [facility]? The address is [place] [address]. I'll go pick it up and then wait a month to see how it's going. I also need to schedule my follow up too. Thank you so much for your time today! It a a pleasure meeting you too and I appreciate all the advice and your time. See you soon!

## 2022-09-20 ENCOUNTER — OFFICE VISIT (OUTPATIENT)
Dept: OBGYN CLINIC | Facility: CLINIC | Age: 52
End: 2022-09-20

## 2022-09-20 VITALS
DIASTOLIC BLOOD PRESSURE: 70 MMHG | BODY MASS INDEX: 25.33 KG/M2 | HEIGHT: 65 IN | WEIGHT: 152 LBS | SYSTOLIC BLOOD PRESSURE: 100 MMHG

## 2022-09-20 DIAGNOSIS — Z12.4 PAP SMEAR FOR CERVICAL CANCER SCREENING: Primary | ICD-10-CM

## 2022-09-20 DIAGNOSIS — N95.1 PERIMENOPAUSE: ICD-10-CM

## 2022-09-20 DIAGNOSIS — Z01.419 VISIT FOR GYNECOLOGIC EXAMINATION: ICD-10-CM

## 2022-09-20 DIAGNOSIS — Z11.3 SCREEN FOR STD (SEXUALLY TRANSMITTED DISEASE): ICD-10-CM

## 2022-09-20 PROCEDURE — 3008F BODY MASS INDEX DOCD: CPT | Performed by: OBSTETRICS & GYNECOLOGY

## 2022-09-20 PROCEDURE — 87491 CHLMYD TRACH DNA AMP PROBE: CPT | Performed by: OBSTETRICS & GYNECOLOGY

## 2022-09-20 PROCEDURE — 3078F DIAST BP <80 MM HG: CPT | Performed by: OBSTETRICS & GYNECOLOGY

## 2022-09-20 PROCEDURE — 87624 HPV HI-RISK TYP POOLED RSLT: CPT | Performed by: OBSTETRICS & GYNECOLOGY

## 2022-09-20 PROCEDURE — 99386 PREV VISIT NEW AGE 40-64: CPT | Performed by: OBSTETRICS & GYNECOLOGY

## 2022-09-20 PROCEDURE — 3074F SYST BP LT 130 MM HG: CPT | Performed by: OBSTETRICS & GYNECOLOGY

## 2022-09-20 PROCEDURE — 87591 N.GONORRHOEAE DNA AMP PROB: CPT | Performed by: OBSTETRICS & GYNECOLOGY

## 2022-09-20 RX ORDER — LEVONORGESTREL AND ETHINYL ESTRADIOL 6-5-10
1 KIT ORAL DAILY
Qty: 84 TABLET | Refills: 3 | Status: SHIPPED | OUTPATIENT
Start: 2022-09-20 | End: 2023-09-20

## 2022-09-21 LAB
C TRACH DNA SPEC QL NAA+PROBE: NEGATIVE
HPV I/H RISK 1 DNA SPEC QL NAA+PROBE: NEGATIVE
N GONORRHOEA DNA SPEC QL NAA+PROBE: NEGATIVE

## 2022-11-09 ENCOUNTER — OFFICE VISIT (OUTPATIENT)
Dept: ENDOCRINOLOGY CLINIC | Facility: CLINIC | Age: 52
End: 2022-11-09
Payer: COMMERCIAL

## 2022-11-09 VITALS
WEIGHT: 159 LBS | SYSTOLIC BLOOD PRESSURE: 120 MMHG | HEART RATE: 61 BPM | DIASTOLIC BLOOD PRESSURE: 76 MMHG | BODY MASS INDEX: 26 KG/M2

## 2022-11-09 DIAGNOSIS — E06.3 HYPOTHYROIDISM DUE TO HASHIMOTO'S THYROIDITIS: Primary | ICD-10-CM

## 2022-11-09 DIAGNOSIS — E55.9 VITAMIN D DEFICIENCY: ICD-10-CM

## 2022-11-09 DIAGNOSIS — E03.8 HYPOTHYROIDISM DUE TO HASHIMOTO'S THYROIDITIS: Primary | ICD-10-CM

## 2022-11-09 PROCEDURE — 3078F DIAST BP <80 MM HG: CPT | Performed by: INTERNAL MEDICINE

## 2022-11-09 PROCEDURE — 99203 OFFICE O/P NEW LOW 30 MIN: CPT | Performed by: INTERNAL MEDICINE

## 2022-11-09 PROCEDURE — 3074F SYST BP LT 130 MM HG: CPT | Performed by: INTERNAL MEDICINE

## 2022-11-09 RX ORDER — LEVOTHYROXINE SODIUM 112 UG/1
CAPSULE ORAL
Qty: 45 CAPSULE | Refills: 3 | Status: SHIPPED | OUTPATIENT
Start: 2022-11-09

## 2022-11-09 RX ORDER — LEVOTHYROXINE SODIUM 125 UG/1
CAPSULE ORAL
Qty: 45 CAPSULE | Refills: 3 | Status: SHIPPED | OUTPATIENT
Start: 2022-11-09

## 2022-11-11 ENCOUNTER — PATIENT MESSAGE (OUTPATIENT)
Dept: ENDOCRINOLOGY CLINIC | Facility: CLINIC | Age: 52
End: 2022-11-11

## 2022-11-16 NOTE — TELEPHONE ENCOUNTER
From: Griselda Channel  To: Rossana Lombardi MD  Sent: 11/11/2022 8:37 AM CST  Subject: Medication on Hold     Good Morning Dr. Sony Coffman,    Thanks again for the appointment this week, it's appreciated. I wanted to let you know that [facility] has the higher dosage of Tirosint marked as in progress but the lower dosage (112) is stuck on hold with them. You said to contact the office if I needed help so just wanted to see if you can help get it through. The mail order number is [id number]. Thank you so much.
Michelle  She was unable to assist RN stating I would need to be connected to their doctor's line to clarify some information regarding the prescription, which is currently closed. They will be open [id number] CST tomorrow.
Spoke to Jacoby, from [employer] and they just wanted to make sure if it needs to be brand name only. Per chart review, it needs to be brand name. RN gave this information to the tech, and they will process the script. Responded to patient with this information.
none

## 2023-05-25 ENCOUNTER — HOSPITAL ENCOUNTER (OUTPATIENT)
Dept: MAMMOGRAPHY | Age: 53
Discharge: HOME | End: 2023-05-25
Attending: FAMILY MEDICINE
Payer: COMMERCIAL

## 2023-05-25 DIAGNOSIS — Z12.31 ENCOUNTER FOR SCREENING MAMMOGRAM FOR MALIGNANT NEOPLASM OF BREAST: ICD-10-CM

## 2023-05-25 PROCEDURE — 77063 BREAST TOMOSYNTHESIS BI: CPT | Performed by: FAMILY MEDICINE

## 2023-05-25 PROCEDURE — 77067 SCR MAMMO BI INCL CAD: CPT | Performed by: FAMILY MEDICINE

## 2023-05-26 ENCOUNTER — PATIENT MESSAGE (OUTPATIENT)
Dept: INTERNAL MEDICINE CLINIC | Facility: CLINIC | Age: 53
End: 2023-05-26

## 2023-05-26 NOTE — TELEPHONE ENCOUNTER
Spoke to patient. States she saw mammogram results in My Chart. State she does not have any questions regarding results.

## 2023-05-26 NOTE — TELEPHONE ENCOUNTER
From: Diane Bliss  To: Christie Mota MD  Sent: 5/26/2023 10:28 AM CDT  Subject: Mammogram results    Hi Dr. Amador Hasten you're having a good day! I just wanted to see if the results of my mammogram are in. Usually they are back same day and I don't see anything yet, so just wanted to make sure they're not hung up somewhere. It still shows my mammogram is overdue in my chart to it can be a snafu. Thank you so much for your help and enjoy your weekend!   Reggie Neri

## 2023-06-08 ENCOUNTER — PATIENT MESSAGE (OUTPATIENT)
Dept: ENDOCRINOLOGY CLINIC | Facility: CLINIC | Age: 53
End: 2023-06-08

## 2023-06-08 ENCOUNTER — OFFICE VISIT (OUTPATIENT)
Dept: INTERNAL MEDICINE CLINIC | Facility: CLINIC | Age: 53
End: 2023-06-08
Payer: COMMERCIAL

## 2023-06-08 VITALS
SYSTOLIC BLOOD PRESSURE: 122 MMHG | OXYGEN SATURATION: 98 % | DIASTOLIC BLOOD PRESSURE: 74 MMHG | HEIGHT: 65 IN | BODY MASS INDEX: 26.49 KG/M2 | WEIGHT: 159 LBS | HEART RATE: 74 BPM

## 2023-06-08 DIAGNOSIS — B35.1 TOENAIL FUNGUS: ICD-10-CM

## 2023-06-08 DIAGNOSIS — Z02.1 PRE-EMPLOYMENT EXAMINATION: Primary | ICD-10-CM

## 2023-06-08 PROCEDURE — 86480 TB TEST CELL IMMUN MEASURE: CPT | Performed by: FAMILY MEDICINE

## 2023-06-08 NOTE — TELEPHONE ENCOUNTER
PA for Tirosint 112 mcg and 125 mcg dose - submitted as an urgent request on surescripts. Waiting for determination.

## 2023-06-08 NOTE — TELEPHONE ENCOUNTER
From: Ruby Villeda  To: Jill Khan MD  Sent: 2023 8:59 AM CDT  Subject: Urgent - Preauthorization Needed for Refill    Hi Dr. Lieutenant Castaneda,     Good morning, I am sorry for the short notice, but I called to refill my Tirosint prescription today and they said the preauthorization  3 days ago. I really need to refill it today or tomorrow because I am starting a new job on Monday and will have a different insurance plan (so I am sure I will need to get new prescription numbers for my new insurance). Can someone from your office please call [facility]'s urgent preauth number [id number] or fax it to [phone] (marked URGENT) and give the approval for [id number]? If it can't be done before I start my new insurance, I understand. My apologies, I thought the preauth  in July for some reason. If I can't get it renewed, I will just wait until my new insurance begins and we can get established with them. I'll just take the 125 every other day until that happens.     Thank you so much,  Nolan Cushing

## 2023-06-09 NOTE — TELEPHONE ENCOUNTER
PA approved. Called patient to notify:    Your PA request has been approved. Additional information will be provided in the approval communication. Priority: UrgentDrug Name: Zahida Romero: 112 MCGQuantity: 45Days Supply: 90ICD-10 Code: R69 ICD-10 Diagnosis: Illness, unspecifiedReject Code: 75Reject Message: +MUST Use generic levothyroxine or prefe rred brand Synthroid MEDICAL NECESSITY PA ONLY [phone] Drug Requires Prior Authorization OBC Made: NoOutcome: Other; Manual Criteria **TSU Paid claim foundQ 2 RPH maintaining as yes as per protocol/clinical judgement, pt is contraindicated to change to FA 1) sensitive dx 2) currently stable 3) may cause harm to change medication. criteria met pa approved for 12 months.

## 2023-06-12 ENCOUNTER — TELEPHONE (OUTPATIENT)
Dept: INTERNAL MEDICINE CLINIC | Facility: CLINIC | Age: 53
End: 2023-06-12

## 2023-06-12 DIAGNOSIS — Z02.1 PRE-EMPLOYMENT EXAMINATION: Primary | ICD-10-CM

## 2023-06-12 NOTE — TELEPHONE ENCOUNTER
Saundra from lab calling. States that test for Quantiferon TB was short blood and needs to be redrawn. Order entered. Patient called and message left informing that lab needs to be redrawn. Patient is able to go to [facility] for redraw. Advised to call the office with any questions.

## 2023-06-30 ENCOUNTER — LAB ENCOUNTER (OUTPATIENT)
Dept: LAB | Age: 53
End: 2023-06-30
Attending: INTERNAL MEDICINE
Payer: COMMERCIAL

## 2023-06-30 DIAGNOSIS — Z02.1 PRE-EMPLOYMENT EXAMINATION: ICD-10-CM

## 2023-06-30 DIAGNOSIS — E55.9 VITAMIN D DEFICIENCY: ICD-10-CM

## 2023-06-30 DIAGNOSIS — E06.3 HYPOTHYROIDISM DUE TO HASHIMOTO'S THYROIDITIS: ICD-10-CM

## 2023-06-30 DIAGNOSIS — E03.8 HYPOTHYROIDISM DUE TO HASHIMOTO'S THYROIDITIS: ICD-10-CM

## 2023-06-30 LAB
T4 FREE SERPL-MCNC: 1.1 NG/DL (ref 0.8–1.7)
TSI SER-ACNC: 7.04 MIU/ML (ref 0.36–3.74)
VIT D+METAB SERPL-MCNC: 40.1 NG/ML (ref 30–100)

## 2023-06-30 PROCEDURE — 86480 TB TEST CELL IMMUN MEASURE: CPT | Performed by: FAMILY MEDICINE

## 2023-07-03 LAB
M TB IFN-G CD4+ T-CELLS BLD-ACNC: 0 IU/ML
M TB TUBERC IFN-G BLD QL: NEGATIVE
M TB TUBERC IGNF/MITOGEN IGNF CONTROL: >10 IU/ML
QFT TB1 AG MINUS NIL: 0.01 IU/ML
QFT TB2 AG MINUS NIL: 0 IU/ML

## 2023-07-04 ENCOUNTER — PATIENT MESSAGE (OUTPATIENT)
Dept: ENDOCRINOLOGY CLINIC | Facility: CLINIC | Age: 53
End: 2023-07-04

## 2023-07-04 DIAGNOSIS — E03.8 HYPOTHYROIDISM DUE TO HASHIMOTO'S THYROIDITIS: Primary | ICD-10-CM

## 2023-07-04 DIAGNOSIS — E06.3 HYPOTHYROIDISM DUE TO HASHIMOTO'S THYROIDITIS: Primary | ICD-10-CM

## 2023-07-12 RX ORDER — LEVOTHYROXINE SODIUM 112 UG/1
CAPSULE ORAL
Qty: 45 CAPSULE | Refills: 3 | Status: SHIPPED | OUTPATIENT
Start: 2023-07-12

## 2023-07-12 RX ORDER — LEVOTHYROXINE SODIUM 125 UG/1
CAPSULE ORAL
Qty: 45 CAPSULE | Refills: 3 | Status: SHIPPED | OUTPATIENT
Start: 2023-07-12

## 2023-07-12 NOTE — TELEPHONE ENCOUNTER
From: Alexis Gaffney  Sent: 7/11/2023 8:53 PM CDT  To: Tiffany Munoz Clinical Staff  Subject: Thyroid Results    Hi Dr. Kathrin Reno, thanks for the reply and I hope you had a nice vacation! I have discontinued the Trivora to see if it stabilizes the TSH number. This happened to me once before (when I was not on any hormone replacement therapy) and that is why my doctor at that time switched me to [facility]. If you can put in an order for bloodwork, I will wait the 6 weeks and go take a retest. Dr Cong Hein had said we could try other options for my issues related to periomenopause, so if my TSH returns to normal I'll talk to her about some of those other options instead of increasing my thyroid meds. Since I have a new insurance plan, I am going to need to get new prescriptions for my thyroid meds, no matter the dosages. One other question, I am going to see Dr. Angelo Hinton for my checkup soon and was thinking about speaking to her about taking Ozempic to get some of this weight off, but before I do I wanted to make sure there is no concern with my thyroid issues. Let me know your thoughts on that if you have any input, I'm happy to hear it. Thanks so much and have a great evening.

## 2023-09-02 ENCOUNTER — LAB ENCOUNTER (OUTPATIENT)
Dept: LAB | Age: 53
End: 2023-09-02
Attending: INTERNAL MEDICINE
Payer: COMMERCIAL

## 2023-09-02 DIAGNOSIS — E03.8 HYPOTHYROIDISM DUE TO HASHIMOTO'S THYROIDITIS: ICD-10-CM

## 2023-09-02 DIAGNOSIS — E06.3 HYPOTHYROIDISM DUE TO HASHIMOTO'S THYROIDITIS: ICD-10-CM

## 2023-09-02 LAB
T4 FREE SERPL-MCNC: 1 NG/DL (ref 0.8–1.7)
TSI SER-ACNC: 2.29 MIU/ML (ref 0.36–3.74)

## 2023-09-02 PROCEDURE — 84439 ASSAY OF FREE THYROXINE: CPT

## 2023-09-02 PROCEDURE — 84443 ASSAY THYROID STIM HORMONE: CPT

## 2023-09-02 PROCEDURE — 36415 COLL VENOUS BLD VENIPUNCTURE: CPT

## 2023-09-05 ENCOUNTER — MED REC SCAN ONLY (OUTPATIENT)
Dept: ENDOCRINOLOGY CLINIC | Facility: CLINIC | Age: 53
End: 2023-09-05

## 2023-11-27 ENCOUNTER — OFFICE VISIT (OUTPATIENT)
Dept: INTERNAL MEDICINE CLINIC | Facility: CLINIC | Age: 53
End: 2023-11-27
Payer: COMMERCIAL

## 2023-11-27 VITALS
SYSTOLIC BLOOD PRESSURE: 112 MMHG | WEIGHT: 168 LBS | DIASTOLIC BLOOD PRESSURE: 76 MMHG | HEIGHT: 65 IN | OXYGEN SATURATION: 100 % | HEART RATE: 80 BPM | BODY MASS INDEX: 27.99 KG/M2

## 2023-11-27 DIAGNOSIS — R55 SYNCOPE, UNSPECIFIED SYNCOPE TYPE: ICD-10-CM

## 2023-11-27 DIAGNOSIS — R63.5 WEIGHT GAIN: ICD-10-CM

## 2023-11-27 DIAGNOSIS — Z12.11 COLON CANCER SCREENING: ICD-10-CM

## 2023-11-27 DIAGNOSIS — R55 VASOVAGAL EPISODE: ICD-10-CM

## 2023-11-27 DIAGNOSIS — N95.1 PERIMENOPAUSE: ICD-10-CM

## 2023-11-27 DIAGNOSIS — E03.9 HYPOTHYROIDISM, UNSPECIFIED TYPE: ICD-10-CM

## 2023-11-27 DIAGNOSIS — Z00.00 PHYSICAL EXAM: Primary | ICD-10-CM

## 2023-11-27 NOTE — PATIENT INSTRUCTIONS
Recommendations on weight loss:  - Drink 8 glasses of water a day  - Do not drink your calories ( no regular pop, juice, high calorie coffee drinks, limit alcohol)  - Eat high protein meals and snacks  - Don't deprive yourself of food you like, just limit amount and make smart choices  - Write down everything you eat for a few days- right away and think about why you are eating ( are you hungry, or are you eating because you are bored, tired, etc)- to get back on track or use Lose it Yamel  - Do not eat late at night  - Exercise- aim for 30 minutes 5 times a week of cardiac activity. Also strength training with light weights is helpful  - Weight yourself once a week first thing in the morning    Food advice:    1. Cut down your sugar consumption  2. Cut down refined grains/ carbs  3. Eat a good amount of protein and natural fats ( lean meats/avocado)  4. Increase natural fiber ( fruits/veggies)    Use yamel LOSE IT or MY FITNESS PAL    Also consider weight watchers    [employer] resource: dietSava [employer]    Good books: The Sparks Diet Solution ( helps with tips to stay motivated)  The Obesity Code and The Complete Guide to Intermittent Fasting - both about fasting and by Dr. Leana Palomino:  Randy Powell- looks at labels.    EAT HIGH PROTEIN TO FILL YOU UP  AND FOODS HIGH IN FIBER  EAT LESS PROCESSED FOODS/ MORE CLEAN EATING- this makes those two ideas above easier  EXERCISE FOR MOOD/ SLEEP/ENERGY / YOUR HEART/ AND HELP WITH WEIGHT LOSS

## 2023-12-02 ENCOUNTER — OFFICE VISIT (OUTPATIENT)
Dept: OBGYN CLINIC | Facility: CLINIC | Age: 53
End: 2023-12-02
Payer: COMMERCIAL

## 2023-12-02 ENCOUNTER — EKG ENCOUNTER (OUTPATIENT)
Dept: LAB | Age: 53
End: 2023-12-02
Attending: FAMILY MEDICINE
Payer: COMMERCIAL

## 2023-12-02 ENCOUNTER — LAB ENCOUNTER (OUTPATIENT)
Dept: LAB | Age: 53
End: 2023-12-02
Attending: FAMILY MEDICINE
Payer: COMMERCIAL

## 2023-12-02 VITALS — WEIGHT: 164 LBS | BODY MASS INDEX: 27 KG/M2 | SYSTOLIC BLOOD PRESSURE: 126 MMHG | DIASTOLIC BLOOD PRESSURE: 82 MMHG

## 2023-12-02 DIAGNOSIS — R63.5 WEIGHT GAIN: ICD-10-CM

## 2023-12-02 DIAGNOSIS — N95.1 PERIMENOPAUSE: Primary | ICD-10-CM

## 2023-12-02 DIAGNOSIS — Z00.00 PHYSICAL EXAM: ICD-10-CM

## 2023-12-02 DIAGNOSIS — R55 VASOVAGAL EPISODE: ICD-10-CM

## 2023-12-02 LAB
ALBUMIN SERPL-MCNC: 4.3 G/DL (ref 3.2–4.8)
ALBUMIN/GLOB SERPL: 1.5 {RATIO} (ref 1–2)
ALP LIVER SERPL-CCNC: 58 U/L
ALT SERPL-CCNC: 12 U/L
ANION GAP SERPL CALC-SCNC: 5 MMOL/L (ref 0–18)
AST SERPL-CCNC: 17 U/L (ref ?–34)
BASOPHILS # BLD AUTO: 0.07 X10(3) UL (ref 0–0.2)
BASOPHILS NFR BLD AUTO: 0.9 %
BILIRUB SERPL-MCNC: 0.8 MG/DL (ref 0.3–1.2)
BUN BLD-MCNC: 15 MG/DL (ref 9–23)
BUN/CREAT SERPL: 20 (ref 10–20)
CALCIUM BLD-MCNC: 9.5 MG/DL (ref 8.7–10.4)
CHLORIDE SERPL-SCNC: 105 MMOL/L (ref 98–112)
CHOLEST SERPL-MCNC: 228 MG/DL (ref ?–200)
CO2 SERPL-SCNC: 30 MMOL/L (ref 21–32)
CREAT BLD-MCNC: 0.75 MG/DL
DEPRECATED RDW RBC AUTO: 41.3 FL (ref 35.1–46.3)
EGFRCR SERPLBLD CKD-EPI 2021: 96 ML/MIN/1.73M2 (ref 60–?)
EOSINOPHIL # BLD AUTO: 0.36 X10(3) UL (ref 0–0.7)
EOSINOPHIL NFR BLD AUTO: 4.9 %
ERYTHROCYTE [DISTWIDTH] IN BLOOD BY AUTOMATED COUNT: 12.4 % (ref 11–15)
EST. AVERAGE GLUCOSE BLD GHB EST-MCNC: 114 MG/DL (ref 68–126)
FASTING PATIENT LIPID ANSWER: YES
FASTING STATUS PATIENT QL REPORTED: YES
GLOBULIN PLAS-MCNC: 2.9 G/DL (ref 2.8–4.4)
GLUCOSE BLD-MCNC: 105 MG/DL (ref 70–99)
HBA1C MFR BLD: 5.6 % (ref ?–5.7)
HCT VFR BLD AUTO: 40.5 %
HDLC SERPL-MCNC: 76 MG/DL (ref 40–59)
HGB BLD-MCNC: 13.5 G/DL
IMM GRANULOCYTES # BLD AUTO: 0.01 X10(3) UL (ref 0–1)
IMM GRANULOCYTES NFR BLD: 0.1 %
LDLC SERPL CALC-MCNC: 143 MG/DL (ref ?–100)
LYMPHOCYTES # BLD AUTO: 2.38 X10(3) UL (ref 1–4)
LYMPHOCYTES NFR BLD AUTO: 32.1 %
MCH RBC QN AUTO: 30.4 PG (ref 26–34)
MCHC RBC AUTO-ENTMCNC: 33.3 G/DL (ref 31–37)
MCV RBC AUTO: 91.2 FL
MONOCYTES # BLD AUTO: 0.55 X10(3) UL (ref 0.1–1)
MONOCYTES NFR BLD AUTO: 7.4 %
NEUTROPHILS # BLD AUTO: 4.04 X10 (3) UL (ref 1.5–7.7)
NEUTROPHILS # BLD AUTO: 4.04 X10(3) UL (ref 1.5–7.7)
NEUTROPHILS NFR BLD AUTO: 54.6 %
NONHDLC SERPL-MCNC: 152 MG/DL (ref ?–130)
OSMOLALITY SERPL CALC.SUM OF ELEC: 291 MOSM/KG (ref 275–295)
PLATELET # BLD AUTO: 373 10(3)UL (ref 150–450)
POTASSIUM SERPL-SCNC: 5 MMOL/L (ref 3.5–5.1)
PROT SERPL-MCNC: 7.2 G/DL (ref 5.7–8.2)
RBC # BLD AUTO: 4.44 X10(6)UL
SODIUM SERPL-SCNC: 140 MMOL/L (ref 136–145)
TRIGL SERPL-MCNC: 52 MG/DL (ref 30–149)
VLDLC SERPL CALC-MCNC: 10 MG/DL (ref 0–30)
WBC # BLD AUTO: 7.4 X10(3) UL (ref 4–11)

## 2023-12-02 PROCEDURE — 85025 COMPLETE CBC W/AUTO DIFF WBC: CPT | Performed by: FAMILY MEDICINE

## 2023-12-02 PROCEDURE — 3079F DIAST BP 80-89 MM HG: CPT | Performed by: OBSTETRICS & GYNECOLOGY

## 2023-12-02 PROCEDURE — 80053 COMPREHEN METABOLIC PANEL: CPT | Performed by: FAMILY MEDICINE

## 2023-12-02 PROCEDURE — 83036 HEMOGLOBIN GLYCOSYLATED A1C: CPT | Performed by: FAMILY MEDICINE

## 2023-12-02 PROCEDURE — 3074F SYST BP LT 130 MM HG: CPT | Performed by: OBSTETRICS & GYNECOLOGY

## 2023-12-02 PROCEDURE — 80061 LIPID PANEL: CPT | Performed by: FAMILY MEDICINE

## 2023-12-02 PROCEDURE — 99213 OFFICE O/P EST LOW 20 MIN: CPT | Performed by: OBSTETRICS & GYNECOLOGY

## 2023-12-04 ENCOUNTER — TELEPHONE (OUTPATIENT)
Dept: ENDOCRINOLOGY CLINIC | Facility: CLINIC | Age: 53
End: 2023-12-04

## 2023-12-04 DIAGNOSIS — E03.8 HYPOTHYROIDISM DUE TO HASHIMOTO'S THYROIDITIS: Primary | ICD-10-CM

## 2023-12-04 DIAGNOSIS — E06.3 HYPOTHYROIDISM DUE TO HASHIMOTO'S THYROIDITIS: Primary | ICD-10-CM

## 2023-12-04 LAB
ATRIAL RATE: 64 BPM
P AXIS: 48 DEGREES
P-R INTERVAL: 142 MS
Q-T INTERVAL: 418 MS
QRS DURATION: 90 MS
QTC CALCULATION (BEZET): 431 MS
R AXIS: 63 DEGREES
T AXIS: 11 DEGREES
VENTRICULAR RATE: 64 BPM

## 2023-12-04 NOTE — TELEPHONE ENCOUNTER
Please call patient - I received a note from gynecology regarding a supplement for menopause. Ok to start supplement and lets recheck TSH, FT4 6-8 weeks later. Also she is due for follow up visit. Thanks.

## 2023-12-05 NOTE — TELEPHONE ENCOUNTER
Spoke to patient to relay message below - patient stated understanding and will check thyroid labs 6-8 weeks after starting supplement  Labs ordered   F/U scheduled 5/1/23

## 2024-01-02 NOTE — PROGRESS NOTES
CC:  Follow - Up (ER f/u )      Hx of CC:    Hosp f/u 12/19/23 [facility]    Didn't feel well- near syncopal episode and went to the ER  Had chest tightness  Had abnl stress test and then cath which was fine  Has holter monitor on now- going to f/u with cardiology through [facility]- Dr Herzog     Also had EEG and MRI for hx syncopal episode   Saw neurologist also- told syncope and nothing else    Feels well now  But nervous and weak ( also had stomach flu at [facility])     Has stopped drinking coffee and that has helped    Allergies:  Allergies   Allergen Reactions    Codeine NAUSEA AND VOMITING      Current Meds:  Current Outpatient Medications   Medication Sig Dispense Refill    TIROSINT 112 MCG Oral Cap Take one tablet every other day with 125mcg dose 45 capsule 3    TIROSINT 125 MCG Oral Cap Take 125mcg every other day with 112mcg tablets 45 capsule 3        History:  Past Medical History:   Diagnosis Date    Fainting     from pain    Hypothyroid     Hypothyroidism       Past Surgical History:   Procedure Laterality Date    CYST ASPIRATION RIGHT      cyst removed rt axilla done about 5yrs ago; also had a cyst in her bikini line removed 10+ years ago    JAW SURGERY      SINUS SURGERY          Family History   Problem Relation Age of Onset    Hypertension Father     Diabetes Father     Other (neuropathy in feet) Mother     Other (Other) Sister         autoimmune eye    Other (PCOS) Sister     Other (Other) Brother         sarcoidosis    Breast Cancer Paternal Aunt         70s      Family Status   Relation Status    Fa Alive    Mo Alive    Sis (Not Specified)    Bro (Not Specified)    Pat Aunt (Not Specified)      Social History     Socioeconomic History    Marital status:    Tobacco Use    Smoking status: Never    Smokeless tobacco: Never   Vaping Use    Vaping Use: Never used   Substance and Sexual Activity    Alcohol use: Yes     Comment: couple beers on weekends    Drug use: Never             ROS:  General:  No fever, no fatigue, no weight changes  HEENT:  Denies congestion or nasal discharge  Cardio:  No chest pain   Pulmonary:  No cough, no SOB  GI:  No N/V/D  No numbness/ tingling/ weakness/ abnormal headaches    Physical:    /70   Pulse 64   Ht 5' 5\" (1.651 m)   Wt 165 lb (74.8 kg)   LMP 11/27/2023 (Exact Date)   SpO2 97%   BMI 27.46 kg/m²     General:  Alert, appropriate, no acute distress  HEENT:  Normocephalic, supple. Moist mucus membranes.   Cardio:  RRR, no murmurs, S1, S2  Pulmonary:  Clear bilaterally, good air entry  Dermatologic:  No rashes or lesions  EXT: no edema  MS: normal movement   NEURO: no gross deficits   Assessment and Plan:    1. Syncope, unspecified syncope type  Admitted for work up to [facility]  Reviewed records seen in care everywhere.  She had an abnormal stress test but then a normal cath.  She has done all required workup except for the Holter monitor which she is wearing now.  She has a history of syncope usually triggered by pain.  Discussed with her importance of avoiding caffeine, staying well-hydrated and making sure she sleeps well.  Change positions slowly.  She will follow-up with cardiology next week.  Feeling well today except mild fatigue which could be related to recent GI illness    2. Abnormal stress test  Had normal cath  Will f[facility]u with cardiology at [facility] next week    Spent 30 minutes obtaining history, reviewing chart and labs, evaluating patient, discussing treatment options, educating patient on disease progress and completing documentation.        There are no diagnoses linked to this encounter.  None  No orders of the defined types were placed in this encounter.

## 2024-01-04 ENCOUNTER — OFFICE VISIT (OUTPATIENT)
Dept: INTERNAL MEDICINE CLINIC | Facility: CLINIC | Age: 54
End: 2024-01-04
Payer: COMMERCIAL

## 2024-01-04 VITALS
OXYGEN SATURATION: 97 % | BODY MASS INDEX: 27.49 KG/M2 | HEIGHT: 65 IN | HEART RATE: 64 BPM | WEIGHT: 165 LBS | SYSTOLIC BLOOD PRESSURE: 106 MMHG | DIASTOLIC BLOOD PRESSURE: 70 MMHG

## 2024-01-04 DIAGNOSIS — R94.39 ABNORMAL STRESS TEST: ICD-10-CM

## 2024-01-04 DIAGNOSIS — R55 SYNCOPE, UNSPECIFIED SYNCOPE TYPE: Primary | ICD-10-CM

## 2024-01-04 PROCEDURE — 3074F SYST BP LT 130 MM HG: CPT | Performed by: FAMILY MEDICINE

## 2024-01-04 PROCEDURE — 3008F BODY MASS INDEX DOCD: CPT | Performed by: FAMILY MEDICINE

## 2024-01-04 PROCEDURE — 3078F DIAST BP <80 MM HG: CPT | Performed by: FAMILY MEDICINE

## 2024-01-04 PROCEDURE — 99214 OFFICE O/P EST MOD 30 MIN: CPT | Performed by: FAMILY MEDICINE

## 2024-05-01 ENCOUNTER — OFFICE VISIT (OUTPATIENT)
Dept: ENDOCRINOLOGY CLINIC | Facility: CLINIC | Age: 54
End: 2024-05-01
Payer: COMMERCIAL

## 2024-05-01 VITALS
SYSTOLIC BLOOD PRESSURE: 117 MMHG | HEART RATE: 74 BPM | DIASTOLIC BLOOD PRESSURE: 78 MMHG | BODY MASS INDEX: 28 KG/M2 | WEIGHT: 167 LBS

## 2024-05-01 DIAGNOSIS — E06.3 HYPOTHYROIDISM DUE TO HASHIMOTO'S THYROIDITIS: Primary | ICD-10-CM

## 2024-05-01 DIAGNOSIS — E55.9 VITAMIN D DEFICIENCY: ICD-10-CM

## 2024-05-01 DIAGNOSIS — E03.8 HYPOTHYROIDISM DUE TO HASHIMOTO'S THYROIDITIS: Primary | ICD-10-CM

## 2024-05-01 PROCEDURE — 3074F SYST BP LT 130 MM HG: CPT | Performed by: INTERNAL MEDICINE

## 2024-05-01 PROCEDURE — 3078F DIAST BP <80 MM HG: CPT | Performed by: INTERNAL MEDICINE

## 2024-05-01 PROCEDURE — 99213 OFFICE O/P EST LOW 20 MIN: CPT | Performed by: INTERNAL MEDICINE

## 2024-05-01 RX ORDER — LEVOTHYROXINE SODIUM 112 UG/1
CAPSULE ORAL
Qty: 45 CAPSULE | Refills: 3 | Status: SHIPPED | OUTPATIENT
Start: 2024-05-01

## 2024-05-01 RX ORDER — LEVOTHYROXINE SODIUM 125 UG/1
CAPSULE ORAL
Qty: 45 CAPSULE | Refills: 3 | Status: SHIPPED | OUTPATIENT
Start: 2024-05-01

## 2024-05-01 NOTE — PROGRESS NOTES
Name: Lizett Montez  Date: 5/1/2024    Referring Physician: No ref. provider found    Chief Complaint   Patient presents with    Hypothyroidism       HISTORY OF PRESENT ILLNESS   Lizett Montez is a 53 year old female who presents for   Chief Complaint   Patient presents with    Hypothyroidism     54 y/o F presents for follow up evaluation of hypothyroidism.  She was diagnosed with hypothyroidism approximately 15 years ago.  She was initially started on levothyroxine therapy but wasn't effective therefore transition to Tirosint.  She has been absorption with this medication and improvement in labs.      She is maintained on Tirosint 112mcg and 125mcg every other day, she is taking in AM and waiting 30-60 min before eating.  She is currently feeling well.   Energy level stable.     She was unable to tolerate Levothyroxine due to malabsorption, TSH did not improve with generic tablets.     No compression symptoms in neck.     REVIEW OF SYSTEMS  Eyes: no change in vision  Neurologic: no headache, generalized or focal weakness or numbness.  Head: normal  ENT: normal  Lungs: no shortness of breath, wheezing or ALVARES  Cardiovascular:  no chest pain or palpitations  Gastrointestinal:  no abdominal pain, bowel movement problems  Musculoskeletal: no muscle pain or arthralgia  /Gyne: no frequency or discomfort while urinating  Psychiatric:  no acute distress, anxiety  or depression  Skin: normal moisturized skin    Medications:     Current Outpatient Medications:     TIROSINT 112 MCG Oral Cap, Take one tablet every other day with 125mcg dose, Disp: 45 capsule, Rfl: 3    TIROSINT 125 MCG Oral Cap, Take 125mcg every other day with 112mcg tablets, Disp: 45 capsule, Rfl: 3     Allergies:   Allergies   Allergen Reactions    Codeine NAUSEA AND VOMITING       Social History:   Social History     Socioeconomic History    Marital status:    Tobacco Use    Smoking status: Never    Smokeless tobacco: Never   Vaping Use     Vaping status: Never Used   Substance and Sexual Activity    Alcohol use: Yes     Comment: couple beers on weekends    Drug use: Never       Medical History:   Past Medical History:    Fainting    from pain    Hypothyroid    Hypothyroidism       Surgical history:   Past Surgical History:   Procedure Laterality Date    Cyst aspiration right      cyst removed rt axilla done about 5yrs ago; also had a cyst in her bikini line removed 10+ years ago    Jaw surgery      Sinus surgery           PHYSICAL EXAMINATION:  /78   Pulse 74   Wt 167 lb (75.8 kg)   LMP 11/27/2023 (Exact Date)   BMI 27.79 kg/m²     General Appearance:  Alert, in no acute distress, well developed  Eyes: normal conjunctivae, sclera.  Ears/Nose/Mouth/Throat/Neck:  normal hearing, normal speech   Musculoskeletal:  normal muscle strength and tone  PV: normal pulses of carotids, pedals  Skin:  normal moisture and skin texture  Hair & Nails:  normal scalp hair     Neuro:  sensory grossly intact and motor grossly intact  Psychiatric:  oriented to time, self, and place  Nutritional:  no abnormal weight gain or loss    ASSESSMENT/PLAN:    1. Hypothyroidism  - Discussed diagnosis with patient  - Discussed importance of long term treatment  - She does need tirosint for absorption  - Continue current dose  - Recheck TSH, FT4 to evaluate dose  - Further management based on above results     2. Vitamin D deficiency   - Continue Ergocalciferol  - Recheck Vitamin D level     RTC 1 year     5/1/2024  Brenda Watkins MD

## 2024-05-22 ENCOUNTER — PATIENT MESSAGE (OUTPATIENT)
Dept: INTERNAL MEDICINE CLINIC | Facility: CLINIC | Age: 54
End: 2024-05-22

## 2024-05-22 ENCOUNTER — PATIENT MESSAGE (OUTPATIENT)
Dept: ENDOCRINOLOGY CLINIC | Facility: CLINIC | Age: 54
End: 2024-05-22

## 2024-05-22 DIAGNOSIS — Z12.31 ENCOUNTER FOR SCREENING MAMMOGRAM FOR MALIGNANT NEOPLASM OF BREAST: Primary | ICD-10-CM

## 2024-05-22 DIAGNOSIS — Z12.31 VISIT FOR SCREENING MAMMOGRAM: ICD-10-CM

## 2024-05-22 NOTE — TELEPHONE ENCOUNTER
From: Lizett Montez  To: Isabella Mendez  Sent: 5/22/2024 1:47 PM CDT  Subject: Missing Mammogram Order    Hi Dr. Mendez, Hope you are well and that you're enjoying the nice weather! I schedule my Mammogram and I just got a call that they don't have the order in the system. I told them I know you entered it but it's not showing up. Would you mind putting in another order, please? It's not til the end of June so we have some time.    Thanks and have a great summer! :)  Lizett

## 2024-05-22 NOTE — TELEPHONE ENCOUNTER
Patient requesting mammogram order. Last mammogram done 5/25/23.    Prior mammogram reading:      mpression   CONCLUSION:   There is no mammographic evidence of malignancy in either breast. As long as patient's clinical breast exam remains unchanged, annual screening mammogram is recommended.     BI-RADS CATEGORY:    DIAGNOSTIC CATEGORY 1--NEGATIVE ASSESSMENT.       RECOMMENDATIONS:  ROUTINE MAMMOGRAM AND CLINICAL EVALUATION IN 12 MONTHS.       Screening bilateral mammogram pended; authorize if appropriate.

## 2024-05-24 NOTE — TELEPHONE ENCOUNTER
Labs from 5/1/24 are viewable in [employer].  [facility] facility should be able to see these orders.

## 2024-06-26 ENCOUNTER — HOSPITAL ENCOUNTER (OUTPATIENT)
Dept: MAMMOGRAPHY | Age: 54
Discharge: HOME | End: 2024-06-26
Attending: FAMILY MEDICINE

## 2024-06-26 DIAGNOSIS — Z12.31 ENCOUNTER FOR SCREENING MAMMOGRAM FOR MALIGNANT NEOPLASM OF BREAST: ICD-10-CM

## 2024-06-26 PROCEDURE — 77063 BREAST TOMOSYNTHESIS BI: CPT | Performed by: FAMILY MEDICINE

## 2024-06-26 PROCEDURE — 77067 SCR MAMMO BI INCL CAD: CPT | Performed by: FAMILY MEDICINE

## 2024-09-14 ENCOUNTER — LAB ENCOUNTER (OUTPATIENT)
Dept: LAB | Age: 54
End: 2024-09-14
Attending: INTERNAL MEDICINE
Payer: COMMERCIAL

## 2024-09-14 DIAGNOSIS — E06.3 HYPOTHYROIDISM DUE TO HASHIMOTO'S THYROIDITIS: ICD-10-CM

## 2024-09-14 DIAGNOSIS — E55.9 VITAMIN D DEFICIENCY: ICD-10-CM

## 2024-09-14 LAB
T4 FREE SERPL-MCNC: 1.6 NG/DL (ref 0.8–1.7)
VIT D+METAB SERPL-MCNC: 30.6 NG/ML (ref 30–100)

## 2024-09-14 PROCEDURE — 82306 VITAMIN D 25 HYDROXY: CPT

## 2024-09-14 PROCEDURE — 84439 ASSAY OF FREE THYROXINE: CPT

## 2024-09-14 PROCEDURE — 36415 COLL VENOUS BLD VENIPUNCTURE: CPT

## 2024-10-07 ENCOUNTER — TELEMEDICINE (OUTPATIENT)
Dept: INTERNAL MEDICINE CLINIC | Facility: CLINIC | Age: 54
End: 2024-10-07
Payer: COMMERCIAL

## 2024-10-07 DIAGNOSIS — Z00.00 PHYSICAL EXAM: Primary | ICD-10-CM

## 2024-10-07 DIAGNOSIS — F41.9 ANXIETY: ICD-10-CM

## 2024-10-07 RX ORDER — SERTRALINE HYDROCHLORIDE 25 MG/1
25 TABLET, FILM COATED ORAL DAILY
Qty: 90 TABLET | Refills: 3 | Status: SHIPPED | OUTPATIENT
Start: 2024-10-07

## 2024-10-07 NOTE — PROGRESS NOTES
Virtual Video Check-In  Patient verbally consents to a Virtual/Telephone Check-In visit on   Patient understands and accepts financial responsibility for any deductible, co-insurance and/or co-pays associated with this service.    Duration of the service:  minutes    [place]    CC:  No chief complaint on file.      Hx of CC:    F/u sertraline  25 mg for anxiety   Helped with sleep and morning anxiety   Still has anxiety with crowds  Thinks anxiety a lot is hormonal related   No side effects  Never tried xanax     Working on diet and exercise and has lost weight    Allergies:  Allergies   Allergen Reactions    Codeine NAUSEA AND VOMITING      Current Meds:  Current Outpatient Medications   Medication Sig Dispense Refill    sertraline 25 MG Oral Tab Take 1 tablet (25 mg total) by mouth daily. 90 tablet 0    ALPRAZolam (XANAX) 0.25 MG Oral Tab Take 1 tablet (0.25 mg total) by mouth 2 (two) times daily as needed. 15 tablet 0    TIROSINT 112 MCG Oral Cap Take one tablet every other day with 125mcg dose 45 capsule 3    TIROSINT 125 MCG Oral Cap Take 125mcg every other day with 112mcg tablets 45 capsule 3        History:  Past Medical History:    Fainting    from pain    Hypothyroid    Hypothyroidism      Past Surgical History:   Procedure Laterality Date    Cyst aspiration right      cyst removed rt axilla done about 5yrs ago; also had a cyst in her bikini line removed 10+ years ago    Jaw surgery      Sinus surgery          Family History   Problem Relation Age of Onset    Hypertension Father     Diabetes Father     Other (neuropathy in feet) Mother     Other (Other) Sister         autoimmune eye    Other (PCOS) Sister     Other (Other) Brother         sarcoidosis    Breast Cancer Paternal Aunt         70s      Family Status   Relation Status    Fa Alive    Mo Alive    Sis (Not Specified)    Bro (Not Specified)    Pat Aunt (Not Specified)      Social History     Socioeconomic History    Marital status:     Tobacco Use    Smoking status: Never    Smokeless tobacco: Never   Vaping Use    Vaping status: Never Used   Substance and Sexual Activity    Alcohol use: Yes     Comment: couple beers on weekends    Drug use: Never     Social Determinants of Health     Food Insecurity: Low Risk  (12/19/2023)    Received from [facility] Food Security     Within the past 12 months, the food you bought just didn't last and you didn't have money to get more.: 3     Within the past 12 months, you worried that your food would run out before you got money to buy more.: 3   Transportation Needs: Not At Risk (12/19/2023)    Received from [facility] Transportation Needs     In the past 12 months, has lack of reliable transportation kept you from medical appointments, meetings, work or from getting things needed for daily living?: No   Housing Stability: Not At Risk (12/19/2023)    Received from [facility] Housing     What is your living situation today?: I have a steady place to live     Think about the place you live. Do you have problems with any of the following?: None of the above            ROS:  Per hPI    Physical:    Phone visit     General:  Alert, appropriate, no acute distress  Pt speaking comfortably in full sentence  Psych: normal speech       Assessment and Plan:    1. Anxiety  Doing better   CPM   - sertraline 25 MG Oral Tab; Take 1 tablet (25 mg total) by mouth daily.  Dispense: 90 tablet; Refill: 3    2. Physical exam    Labs prior to PE  - CBC With Differential With Platelet; Future  - Comp Metabolic Panel (14); Future  - Lipid Panel; Future    There are no diagnoses linked to this encounter.  None  No orders of the defined types were placed in this encounter.      VIDEO  visits are being conducted currently because of the coronavirus pandemic.    This has been done in good radha to provide continuity of care in the best interest of the provider-patient  relationship, due to the ongoing public health crisis/national emergency and because of restrictions of visitation.  There are limitations of this visit as no physical exam could be performed.  Every conscious effort was taken to allow for sufficient and adequate time.  This billing was spent on reviewing labs, medications, radiology tests and decision making.  Appropriate medical decision-making and tests are ordered as detailed in the plan of care above.  Pt to come in to the office for in person visit as soon as she is able to and exposure risk is less.

## 2024-11-07 ENCOUNTER — PATIENT MESSAGE (OUTPATIENT)
Dept: INTERNAL MEDICINE CLINIC | Facility: CLINIC | Age: 54
End: 2024-11-07

## 2024-11-17 NOTE — PROGRESS NOTES
Virtual VIDEO  Check-In  Patient verbally consents to a Virtual/Telephone Check-In visit on 11/18/24  Patient understands and accepts financial responsibility for any deductible, co-insurance and/or co-pays associated with this service.    Duration of the service:  minutes    [place]    CC:  No chief complaint on file.      Hx of CC:      F/u anxiety  Has been on sertraline 25 mg and now on 12.5 mg  for 3-4 days  Tried 50 mg and made her feel worse - anxiety, palpitations.       Still has some anxiety from time to time, if big crowds or stressed  Taking magnesium and that helps    Anxiety bad week before period  Took 1/2 of 0.25 mg xanax a few times and it helped    Doesn't feel depressed  Gets daily exercise   Trying breathing techniques            Allergies:  Allergies[1]   Current Meds:  Current Outpatient Medications   Medication Sig Dispense Refill    sertraline 25 MG Oral Tab Take 1 tablet (25 mg total) by mouth daily. 90 tablet 3    ALPRAZolam (XANAX) 0.25 MG Oral Tab Take 1 tablet (0.25 mg total) by mouth 2 (two) times daily as needed. 15 tablet 0    TIROSINT 112 MCG Oral Cap Take one tablet every other day with 125mcg dose 45 capsule 3    TIROSINT 125 MCG Oral Cap Take 125mcg every other day with 112mcg tablets 45 capsule 3        History:  Past Medical History:    Fainting    from pain    Hypothyroid    Hypothyroidism      Past Surgical History:   Procedure Laterality Date    Cyst aspiration right      cyst removed rt axilla done about 5yrs ago; also had a cyst in her bikini line removed 10+ years ago    Jaw surgery      Sinus surgery          Family History   Problem Relation Age of Onset    Hypertension Father     Diabetes Father     Other (neuropathy in feet) Mother     Other (Other) Sister         autoimmune eye    Other (PCOS) Sister     Other (Other) Brother         sarcoidosis    Breast Cancer Paternal Aunt         70s      Family Status   Relation Status    Fa Alive    Mo Alive    Sis (Not  Specified)    Bro (Not Specified)    Pat Aunt (Not Specified)      Social History     Socioeconomic History    Marital status:    Tobacco Use    Smoking status: Never    Smokeless tobacco: Never   Vaping Use    Vaping status: Never Used   Substance and Sexual Activity    Alcohol use: Yes     Comment: couple beers on weekends    Drug use: Never     Social Drivers of Health     Food Insecurity: Low Risk  (12/19/2023)    Received from [facility] Food Security     Within the past 12 months, the food you bought just didn't last and you didn't have money to get more.: 3     Within the past 12 months, you worried that your food would run out before you got money to buy more.: 3   Transportation Needs: Not At Risk (12/19/2023)    Received from [facility] Transportation Needs     In the past 12 months, has lack of reliable transportation kept you from medical appointments, meetings, work or from getting things needed for daily living?: No   Housing Stability: Not At Risk (12/19/2023)    Received from [facility] Housing     What is your living situation today?: I have a steady place to live     Think about the place you live. Do you have problems with any of the following?: None of the above            ROS:    Per hPI    Physical:    Phone visit     General:  Alert, appropriate, no acute distress  Pt speaking comfortably in full sentence  Psych: normal speech       Assessment and Plan:      1. Anxiety  Will stop sertraline.  She thought it made her anxiety worse  Continue exercise, deep breathing, monitoring triggers and anxiety related to her.  Continue magnesium, okay to try ashwognda  Pt given prescription for benzodiazepines. Explained that this is for prn use, such as a panic attack and these medications can be  addictive and tolerance can develop. If patient is needing this medication too much, a daily medication or change in their medication  will be needed to help control their anxiety. Pt agrees to this.     Follow-up in December for physical, labs for  - ALPRAZolam (XANAX) 0.25 MG Oral Tab; Take 1 tablet (0.25 mg total) by mouth 2 (two) times daily as needed.  Dispense: 30 tablet; Refill: 0    2. Physical exam  Check labs prior to physical  - TSH W Reflex To Free T4 [E]; Future      There are no diagnoses linked to this encounter.  None  No orders of the defined types were placed in this encounter.      VIDEO  visits are being conducted currently because of the coronavirus pandemic.    This has been done in good radha to provide continuity of care in the best interest of the provider-patient relationship, due to the ongoing public health crisis/national emergency and because of restrictions of visitation.  There are limitations of this visit as no physical exam could be performed.  Every conscious effort was taken to allow for sufficient and adequate time.  This billing was spent on reviewing labs, medications, radiology tests and decision making.  Appropriate medical decision-making and tests are ordered as detailed in the plan of care above.  Pt to come in to the office for in person visit as soon as she is able to and exposure risk is less.          [1]   Allergies  Allergen Reactions    Codeine NAUSEA AND VOMITING

## 2024-11-18 ENCOUNTER — TELEMEDICINE (OUTPATIENT)
Dept: INTERNAL MEDICINE CLINIC | Facility: CLINIC | Age: 54
End: 2024-11-18
Payer: COMMERCIAL

## 2024-11-18 DIAGNOSIS — F41.9 ANXIETY: ICD-10-CM

## 2024-11-18 DIAGNOSIS — Z00.00 PHYSICAL EXAM: Primary | ICD-10-CM

## 2024-11-18 RX ORDER — ALPRAZOLAM 0.25 MG/1
0.25 TABLET ORAL 2 TIMES DAILY PRN
Qty: 30 TABLET | Refills: 0 | Status: SHIPPED | OUTPATIENT
Start: 2024-11-18

## 2024-11-30 NOTE — PROGRESS NOTES
dietHPI:   Lizett Montez is a 54 year old female who presents for a complete physical exam.  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     Cholesterol, Total (mg/dL)   Date Value   12/02/2023 228 (H)   03/09/2022 214 (H)   03/30/2021 198     HDL Cholesterol (mg/dL)   Date Value   12/02/2023 76 (H)   03/09/2022 74 (H)   03/30/2021 77 (H)     LDL Cholesterol (mg/dL)   Date Value   12/02/2023 143 (H)   03/09/2022 129 (H)   03/30/2021 113 (H)        Current Outpatient Medications   Medication Sig Dispense Refill    ALPRAZolam (XANAX) 0.25 MG Oral Tab Take 1 tablet (0.25 mg total) by mouth 2 (two) times daily as needed. 30 tablet 0    TIROSINT 112 MCG Oral Cap Take one tablet every other day with 125mcg dose 45 capsule 3    TIROSINT 125 MCG Oral Cap Take 125mcg every other day with 112mcg tablets 45 capsule 3      Past Medical History:    Fainting    from pain    Hypothyroid    Hypothyroidism      Past Surgical History:   Procedure Laterality Date    Cyst aspiration right      cyst removed rt axilla done about 5yrs ago; also had a cyst in her bikini line removed 10+ years ago    Jaw surgery      Sinus surgery          Family History   Problem Relation Age of Onset    Hypertension Father     Diabetes Father     Other  (neuropathy in feet) Mother     Other (Other) Sister         autoimmune eye    Other (PCOS) Sister     Other (Other) Brother         sarcoidosis    Breast Cancer Paternal Aunt         70s      Social History:   Social History     Socioeconomic History    Marital status:    Tobacco Use    Smoking status: Never    Smokeless tobacco: Never   Vaping Use    Vaping status: Never Used   Substance and Sexual Activity    Alcohol use: Yes     Comment: couple beers on weekends    Drug use: Never     Social Drivers of Health     Food Insecurity: Low Risk  (12/19/2023)    Received from [facility] Food Security     Within the past 12 months, the food you bought just didn't last and you didn't have money to get more.: 3     Within the past 12 months, you worried that your food would run out before you got money to buy more.: 3   Transportation Needs: Not At Risk (12/19/2023)    Received from [facility] Transportation Needs     In the past 12 months, has lack of reliable transportation kept you from medical appointments, meetings, work or from getting things needed for daily living?: No   Housing Stability: Not At Risk (12/19/2023)    Received from [facility] Housing     What is your living situation today?: I have a steady place to live     Think about the place you live. Do you have problems with any of the following?: None of the above          REVIEW OF SYSTEMS:   GENERAL: feels well otherwise  LUNGS: denies shortness of breath  CARDIOVASCULAR: denies chest pain see HPI  GI: denies abdominal pain,  No constipation or diarrhea  : denies dysuria, vaginal discharge or itching  PSYCHE: denies depression or anxiety    EXAM:   /64   Pulse 66   Ht 5' 5\" (1.651 m)   Wt 165 lb 12.8 oz (75.2 kg)   LMP 11/23/2023 (Exact Date)   SpO2 99%   BMI 27.59 kg/m²   Body mass index is 27.59 kg/m².   GENERAL: well developed, well nourished,in no apparent  distress  SKIN: no rashes,no suspicious lesions  HEENT: atraumatic, normocephalic  EYES:,conjunctiva are clear  NECK: supple,no adenopathy  BREAST: no dominant or suspicious mass, no nipple discharge  LUNGS: clear to auscultation  CARDIO: RRR  GI: good BS's,no masses, HSM or tenderness   EXTREMITIES: no edema    ASSESSMENT AND PLAN:   Lizett Montez is a 54 year old female who presents for a complete physical exam.  Encounter Diagnoses   Name Primary?    Physical exam Yes    Colon cancer screening     Anxiety          Discussed with patient pap smear guidelines and the importance of screening for cervical cancer  Discussed the importance of yearly mammograms starting at age 40 to help detect breast cancer.   Self breast exam explained and encouraged to perform monthly.   Health maintenance labs, recommend yearly: Lipids, CMP, and CBC.   Discussed importance of screening for colon cancer with colonoscopies starting at age 45, or sooner if high risk  Recommended low fat diet, low carb diet and regular aerobic exercise.    The patient indicates understanding of these issues and agrees to the plan.  The patient is asked to return for CPX in 1 yr.      1. Physical exam  Blood work ordered  Increase exercise   - Hemoglobin A1C (Glycohemoglobin) [E]; Future    2. Colon cancer screening      - COLOGUARD COLON CANCER SCREENING (EXTERNAL)    3. Anxiety    Doing better  Increase exercise  Has a prescription for Xanax which rarely takes  Recommend therapy to help with family stress  Follow-up with feeling worse        Orders Placed This Encounter   Procedures    Hemoglobin A1C (Glycohemoglobin) [E]       Meds & Refills for this Visit:  Requested Prescriptions      No prescriptions requested or ordered in this encounter       Imaging & Consults:  COLOGUARD COLON CANCER SCREENING (EXTERNAL)

## 2024-12-02 ENCOUNTER — OFFICE VISIT (OUTPATIENT)
Dept: INTERNAL MEDICINE CLINIC | Facility: CLINIC | Age: 54
End: 2024-12-02
Payer: COMMERCIAL

## 2024-12-02 VITALS
DIASTOLIC BLOOD PRESSURE: 64 MMHG | OXYGEN SATURATION: 99 % | BODY MASS INDEX: 27.63 KG/M2 | HEART RATE: 66 BPM | WEIGHT: 165.81 LBS | SYSTOLIC BLOOD PRESSURE: 104 MMHG | HEIGHT: 65 IN

## 2024-12-02 DIAGNOSIS — Z00.00 PHYSICAL EXAM: Primary | ICD-10-CM

## 2024-12-02 DIAGNOSIS — F41.9 ANXIETY: ICD-10-CM

## 2024-12-02 DIAGNOSIS — Z12.11 COLON CANCER SCREENING: ICD-10-CM

## 2024-12-05 RX ORDER — LEVOTHYROXINE SODIUM 112 UG/1
CAPSULE ORAL
Qty: 45 CAPSULE | Refills: 3 | Status: SHIPPED | OUTPATIENT
Start: 2024-12-05

## 2024-12-05 RX ORDER — LEVOTHYROXINE SODIUM 125 UG/1
CAPSULE ORAL
Qty: 45 CAPSULE | Refills: 3 | Status: SHIPPED | OUTPATIENT
Start: 2024-12-05

## 2024-12-05 NOTE — TELEPHONE ENCOUNTER
Endocrine refill protocol for medications for hypothyroidism and hyperthyroidism    Protocol Criteria:  FAILED labs not completed in time frame    If all below requirements are met, send a 90-day supply with 1 refill per provider protocol.    Verify appointment with Endocrinology completed in the last 12 months or scheduled in the next 6 months.    Normal TSH result in the past 12 months   Review recent telephone encounters and mychart communications with patient to ensure a dose change has not occurred since last office visit that was not updated in the medication history list     Last completed office visit:5/1/2024 Brenda Watkins MD   Next scheduled Follow up: No future appointments.   Last TSH result: 2.290 on 9/2/23

## 2025-01-18 ENCOUNTER — LAB ENCOUNTER (OUTPATIENT)
Dept: LAB | Age: 55
End: 2025-01-18
Attending: FAMILY MEDICINE
Payer: COMMERCIAL

## 2025-01-18 DIAGNOSIS — Z00.00 PHYSICAL EXAM: ICD-10-CM

## 2025-01-18 LAB
ALBUMIN SERPL-MCNC: 4.4 G/DL (ref 3.2–4.8)
ALBUMIN/GLOB SERPL: 1.5 {RATIO} (ref 1–2)
ALP LIVER SERPL-CCNC: 66 U/L
ALT SERPL-CCNC: 14 U/L
ANION GAP SERPL CALC-SCNC: 7 MMOL/L (ref 0–18)
AST SERPL-CCNC: 15 U/L (ref ?–34)
BASOPHILS # BLD AUTO: 0.09 X10(3) UL (ref 0–0.2)
BASOPHILS NFR BLD AUTO: 0.8 %
BILIRUB SERPL-MCNC: 0.7 MG/DL (ref 0.3–1.2)
BUN BLD-MCNC: 10 MG/DL (ref 9–23)
BUN/CREAT SERPL: 13.3 (ref 10–20)
CALCIUM BLD-MCNC: 9.6 MG/DL (ref 8.7–10.4)
CHLORIDE SERPL-SCNC: 105 MMOL/L (ref 98–112)
CHOLEST SERPL-MCNC: 178 MG/DL (ref ?–200)
CO2 SERPL-SCNC: 28 MMOL/L (ref 21–32)
CREAT BLD-MCNC: 0.75 MG/DL
DEPRECATED RDW RBC AUTO: 42 FL (ref 35.1–46.3)
EGFRCR SERPLBLD CKD-EPI 2021: 96 ML/MIN/1.73M2 (ref 60–?)
EOSINOPHIL # BLD AUTO: 0.4 X10(3) UL (ref 0–0.7)
EOSINOPHIL NFR BLD AUTO: 3.7 %
ERYTHROCYTE [DISTWIDTH] IN BLOOD BY AUTOMATED COUNT: 12.4 % (ref 11–15)
EST. AVERAGE GLUCOSE BLD GHB EST-MCNC: 123 MG/DL (ref 68–126)
FASTING PATIENT LIPID ANSWER: YES
FASTING STATUS PATIENT QL REPORTED: YES
GLOBULIN PLAS-MCNC: 3 G/DL (ref 2–3.5)
GLUCOSE BLD-MCNC: 113 MG/DL (ref 70–99)
HBA1C MFR BLD: 5.9 % (ref ?–5.7)
HCT VFR BLD AUTO: 41.8 %
HDLC SERPL-MCNC: 56 MG/DL (ref 40–59)
HGB BLD-MCNC: 14.5 G/DL
IMM GRANULOCYTES # BLD AUTO: 0.03 X10(3) UL (ref 0–1)
IMM GRANULOCYTES NFR BLD: 0.3 %
LDLC SERPL CALC-MCNC: 113 MG/DL (ref ?–100)
LYMPHOCYTES # BLD AUTO: 2.54 X10(3) UL (ref 1–4)
LYMPHOCYTES NFR BLD AUTO: 23.5 %
MCH RBC QN AUTO: 31.9 PG (ref 26–34)
MCHC RBC AUTO-ENTMCNC: 34.7 G/DL (ref 31–37)
MCV RBC AUTO: 91.9 FL
MONOCYTES # BLD AUTO: 0.76 X10(3) UL (ref 0.1–1)
MONOCYTES NFR BLD AUTO: 7 %
NEUTROPHILS # BLD AUTO: 7.01 X10 (3) UL (ref 1.5–7.7)
NEUTROPHILS # BLD AUTO: 7.01 X10(3) UL (ref 1.5–7.7)
NEUTROPHILS NFR BLD AUTO: 64.7 %
NONHDLC SERPL-MCNC: 122 MG/DL (ref ?–130)
OSMOLALITY SERPL CALC.SUM OF ELEC: 290 MOSM/KG (ref 275–295)
PLATELET # BLD AUTO: 294 10(3)UL (ref 150–450)
POTASSIUM SERPL-SCNC: 5.1 MMOL/L (ref 3.5–5.1)
PROT SERPL-MCNC: 7.4 G/DL (ref 5.7–8.2)
RBC # BLD AUTO: 4.55 X10(6)UL
SODIUM SERPL-SCNC: 140 MMOL/L (ref 136–145)
TRIGL SERPL-MCNC: 44 MG/DL (ref 30–149)
TSI SER-ACNC: 3.3 UIU/ML (ref 0.55–4.78)
VLDLC SERPL CALC-MCNC: 8 MG/DL (ref 0–30)
WBC # BLD AUTO: 10.8 X10(3) UL (ref 4–11)

## 2025-01-18 PROCEDURE — 80061 LIPID PANEL: CPT | Performed by: FAMILY MEDICINE

## 2025-01-18 PROCEDURE — 83036 HEMOGLOBIN GLYCOSYLATED A1C: CPT | Performed by: FAMILY MEDICINE

## 2025-01-18 PROCEDURE — 80050 GENERAL HEALTH PANEL: CPT | Performed by: FAMILY MEDICINE

## 2025-03-25 DIAGNOSIS — F41.9 ANXIETY: ICD-10-CM

## 2025-03-25 RX ORDER — ALPRAZOLAM 0.25 MG
0.25 TABLET ORAL 2 TIMES DAILY PRN
Qty: 30 TABLET | Refills: 0 | Status: SHIPPED | OUTPATIENT
Start: 2025-03-25

## 2025-03-25 NOTE — TELEPHONE ENCOUNTER
Future Appointment:None      Last Appointment:12/2/24      Last Refill:11/18/24      Medication Requested:   Requested Prescriptions     Pending Prescriptions Disp Refills    ALPRAZolam (XANAX) 0.25 MG Oral Tab 30 tablet 0     Sig: Take 1 tablet (0.25 mg total) by mouth 2 (two) times daily as needed.     Protocol :     Controlled Substance Medication [id number]/2025 09:53 AM    This medication is a controlled substance - forward to provider to refill    Medication is active on med list

## 2025-03-27 ENCOUNTER — PATIENT MESSAGE (OUTPATIENT)
Dept: INTERNAL MEDICINE CLINIC | Facility: CLINIC | Age: 55
End: 2025-03-27

## 2025-03-27 DIAGNOSIS — N64.4 BREAST PAIN, RIGHT: ICD-10-CM

## 2025-03-27 DIAGNOSIS — Z80.3 FAMILY HISTORY OF BREAST CANCER: ICD-10-CM

## 2025-03-27 DIAGNOSIS — H93.12 TINNITUS OF LEFT EAR: Primary | ICD-10-CM

## 2025-03-27 NOTE — TELEPHONE ENCOUNTER
Hi- yes  Can you please give her ENT referral information and let her know that I entered a diagnostic mammogram for her to schedule.  She needs to call [phone] to schedule.  She cannot schedule it online.  Thank you!

## 2025-04-04 ENCOUNTER — HOSPITAL ENCOUNTER (OUTPATIENT)
Dept: MAMMOGRAPHY | Facility: HOSPITAL | Age: 55
Discharge: HOME | End: 2025-04-04
Attending: FAMILY MEDICINE
Payer: COMMERCIAL

## 2025-04-04 DIAGNOSIS — N64.4 BREAST PAIN, RIGHT: ICD-10-CM

## 2025-04-04 DIAGNOSIS — Z80.3 FAMILY HISTORY OF BREAST CANCER: ICD-10-CM

## 2025-04-04 PROCEDURE — 77065 DX MAMMO INCL CAD UNI: CPT | Performed by: FAMILY MEDICINE

## 2025-04-04 PROCEDURE — 77061 BREAST TOMOSYNTHESIS UNI: CPT | Performed by: FAMILY MEDICINE

## 2025-05-20 ENCOUNTER — OFFICE VISIT (OUTPATIENT)
Dept: OTOLARYNGOLOGY | Facility: CLINIC | Age: 55
End: 2025-05-20
Payer: COMMERCIAL

## 2025-05-20 ENCOUNTER — OFFICE VISIT (OUTPATIENT)
Dept: AUDIOLOGY | Facility: CLINIC | Age: 55
End: 2025-05-20

## 2025-05-20 DIAGNOSIS — H93.13 TINNITUS OF BOTH EARS: Primary | ICD-10-CM

## 2025-05-20 DIAGNOSIS — H90.3 SENSORINEURAL HEARING LOSS (SNHL) OF BOTH EARS: ICD-10-CM

## 2025-05-20 DIAGNOSIS — H91.90 HEARING LOSS, UNSPECIFIED HEARING LOSS TYPE, UNSPECIFIED LATERALITY: Primary | ICD-10-CM

## 2025-05-20 PROCEDURE — 92567 TYMPANOMETRY: CPT | Performed by: AUDIOLOGIST

## 2025-05-20 PROCEDURE — 92552 PURE TONE AUDIOMETRY AIR: CPT | Performed by: AUDIOLOGIST

## 2025-05-20 PROCEDURE — 99203 OFFICE O/P NEW LOW 30 MIN: CPT | Performed by: OTOLARYNGOLOGY

## 2025-05-20 PROCEDURE — 92556 SPEECH AUDIOMETRY COMPLETE: CPT | Performed by: AUDIOLOGIST

## 2025-05-20 NOTE — PROGRESS NOTES
Lizett Montez is a 54 year old female.    Chief Complaint   Patient presents with    Ear Problem     Patient is here due to clogged ears , and ringing in ears        HISTORY OF PRESENT ILLNESS  He presents with a history of occasionally having ringing in her ears and feeling that her ears are clogged.  Today feels pretty good ringing comes and is in both ears.  Audiogram performed today reveals normal low and mid frequency hearing with a very mild sensorineural hearing loss noted at 8000 Hz with normal tympanograms middle ear pressures and speech discrimination scores.      Social Hx on file[1]    Family History[2]    Past Medical History[3]    Past Surgical History[4]      REVIEW OF SYSTEMS    System Neg/Pos Details   Constitutional Negative Fatigue, fever and weight loss.   ENMT Negative Drooling.   Eyes Negative Blurred vision and vision changes.   Respiratory Negative Dyspnea and wheezing.   Cardio Negative Chest pain, irregular heartbeat/palpitations and syncope.   GI Negative Abdominal pain and diarrhea.   Endocrine Negative Cold intolerance and heat intolerance.   Neuro Negative Tremors.   Psych Negative Anxiety and depression.   Integumentary Negative Frequent skin infections, pigment change and rash.   Hema/Lymph Negative Easy bleeding and easy bruising.           PHYSICAL EXAM    LMP 02/28/2025 (Approximate)        Constitutional Normal Overall appearance - Normal.   Psychiatric Normal Orientation - Oriented to time, place, person & situation. Appropriate mood and affect.   Neck Exam Normal Inspection - Normal. Palpation - Normal. Parotid gland - Normal. Thyroid gland - Normal.   Eyes Normal Conjunctiva - Right: Normal, Left: Normal. Pupil - Right: Normal, Left: Normal. Fundus - Right: Normal, Left: Normal.   Neurological Normal Memory - Normal. Cranial nerves - Cranial nerves II through XII grossly intact.   Head/Face Normal Facial features - Normal. Eyebrows - Normal. Skull - Normal.        Nasopharynx  Normal External nose - Normal. Lips/teeth/gums - Normal. Tonsils - Normal. Oropharynx - Normal.   Ears Normal Inspection - Right: Normal, Left: Normal. Canal - Right: Normal, Left: Normal. TM - Right: Normal, Left: Normal.   Skin Normal Inspection - Normal.        Lymph Detail Normal Submental. Submandibular. Anterior cervical. Posterior cervical. Supraclavicular.        Nose/Mouth/Throat Normal External nose - Normal. Lips/teeth/gums - Normal. Tonsils - Normal. Oropharynx - Normal.   Nose/Mouth/Throat Normal Nares - Right: Normal Left: Normal. Septum -Normal  Turbinates - Right: Normal, Left: Normal.     Medications - Current[5]  ASSESSMENT AND PLAN    1. Hearing loss, unspecified hearing loss type, unspecified laterality  Pretty minimal high-frequency sensorineural hearing loss bilaterally at 8000 Hz.  This could be the reason she has a little bit of ringing in both ears.  I have recommended repeating a hearing test in a year to watch for any interval change in her hearing and to return to see me sooner if she has any further issues.  - Audiology Referral - [place] ([facility])        This note was prepared using Dragon Medical voice recognition dictation software. As a result errors may occur. When identified these errors have been corrected. While every attempt is made to correct errors during dictation discrepancies may still exist    Cassius Matt MD    5/20/2025    2:45 PM         [1]   Social History  Socioeconomic History    Marital status:    Tobacco Use    Smoking status: Never    Smokeless tobacco: Never   Vaping Use    Vaping status: Never Used   Substance and Sexual Activity    Alcohol use: Yes     Comment: couple beers on weekends    Drug use: Never   [2]   Family History  Problem Relation Age of Onset    Hypertension Father     Diabetes Father     Other (neuropathy in feet) Mother     Other (Other) Sister         autoimmune eye    Other (PCOS) Sister     Other (Other) Brother          sarcoidosis    Breast Cancer Paternal Aunt         70s   [3]   Past Medical History:   Fainting    from pain    Hypothyroid    Hypothyroidism   [4]   Past Surgical History:  Procedure Laterality Date    Cyst aspiration right      cyst removed rt axilla done about 5yrs ago; also had a cyst in her bikini line removed 10+ years ago    Jaw surgery      Sinus surgery       [5]   Current Outpatient Medications:     ALPRAZolam (XANAX) 0.25 MG Oral Tab, Take 1 tablet (0.25 mg total) by mouth 2 (two) times daily as needed., Disp: 30 tablet, Rfl: 0    TIROSINT 112 MCG Oral Cap, TAKE ONE Capsule BY MOUTH EVERY OTHER DAY with 125 mcg, Disp: 45 capsule, Rfl: 3    Levothyroxine Sodium (TIROSINT) 125 MCG Oral Cap, TAKE ONE Capsule BY MOUTH EVERY OTHER DAY, Disp: 45 capsule, Rfl: 3

## 2025-07-18 ENCOUNTER — HOSPITAL ENCOUNTER (OUTPATIENT)
Dept: MAMMOGRAPHY | Age: 55
Discharge: HOME | End: 2025-07-18
Attending: FAMILY MEDICINE
Payer: COMMERCIAL

## 2025-07-18 DIAGNOSIS — Z12.31 ENCOUNTER FOR SCREENING MAMMOGRAM FOR MALIGNANT NEOPLASM OF BREAST: ICD-10-CM

## 2025-07-18 PROCEDURE — 77063 BREAST TOMOSYNTHESIS BI: CPT | Performed by: FAMILY MEDICINE

## 2025-07-18 PROCEDURE — 77067 SCR MAMMO BI INCL CAD: CPT | Performed by: FAMILY MEDICINE

## 2025-08-06 ENCOUNTER — HOSPITAL ENCOUNTER (OUTPATIENT)
Dept: ULTRASOUND IMAGING | Facility: HOSPITAL | Age: 55
Discharge: HOME | End: 2025-08-06
Attending: FAMILY MEDICINE

## 2025-08-06 DIAGNOSIS — R92.8 ABNORMAL MAMMOGRAM: ICD-10-CM

## 2025-08-06 PROCEDURE — 76642 ULTRASOUND BREAST LIMITED: CPT | Performed by: FAMILY MEDICINE

## (undated) NOTE — LETTER
6/21/2018    Dear Dr. Nettie Zimmerman,     Thank you for your referral and continued support of the [facility]. I met with Bessy Dos Santos on 6/21/2018. Below is a brief overview of the visit and the current nutrition plan we have established. sleeping better. Patient has lost 9.5 lbs in the last 2 months in which her weight has been stuck at for quite some time with no changes no matter how much she exercised and cleaned her diet. Have patient continue with current changes.       Plan and Futu